# Patient Record
Sex: MALE | Race: BLACK OR AFRICAN AMERICAN | NOT HISPANIC OR LATINO | Employment: FULL TIME | URBAN - METROPOLITAN AREA
[De-identification: names, ages, dates, MRNs, and addresses within clinical notes are randomized per-mention and may not be internally consistent; named-entity substitution may affect disease eponyms.]

---

## 2021-12-14 ENCOUNTER — APPOINTMENT (OUTPATIENT)
Dept: GENERAL RADIOLOGY | Facility: HOSPITAL | Age: 46
End: 2021-12-14

## 2021-12-14 ENCOUNTER — APPOINTMENT (OUTPATIENT)
Dept: CT IMAGING | Facility: HOSPITAL | Age: 46
End: 2021-12-14

## 2021-12-14 ENCOUNTER — HOSPITAL ENCOUNTER (INPATIENT)
Facility: HOSPITAL | Age: 46
LOS: 4 days | Discharge: HOME | End: 2021-12-20
Attending: EMERGENCY MEDICINE

## 2021-12-14 DIAGNOSIS — G47.33 OSA (OBSTRUCTIVE SLEEP APNEA): ICD-10-CM

## 2021-12-14 DIAGNOSIS — R60.1 ANASARCA: ICD-10-CM

## 2021-12-14 DIAGNOSIS — I44.1 SECOND DEGREE HEART BLOCK: ICD-10-CM

## 2021-12-14 DIAGNOSIS — R79.89 ELEVATED SERUM CREATININE: ICD-10-CM

## 2021-12-14 DIAGNOSIS — R03.0 ELEVATED BLOOD PRESSURE READING: ICD-10-CM

## 2021-12-14 DIAGNOSIS — E88.09 HYPOALBUMINEMIA: Primary | ICD-10-CM

## 2021-12-14 LAB
ALBUMIN SERPL-MCNC: 1.7 G/DL (ref 3.5–5.2)
ALBUMIN/GLOB SERPL: 0.5 G/DL
ALP SERPL-CCNC: 85 U/L (ref 39–117)
ALT SERPL W P-5'-P-CCNC: 12 U/L (ref 1–41)
ANION GAP SERPL CALCULATED.3IONS-SCNC: 7.3 MMOL/L (ref 5–15)
AST SERPL-CCNC: 15 U/L (ref 1–40)
BASOPHILS # BLD AUTO: 0.04 10*3/MM3 (ref 0–0.2)
BASOPHILS NFR BLD AUTO: 0.5 % (ref 0–1.5)
BILIRUB SERPL-MCNC: 0.2 MG/DL (ref 0–1.2)
BUN SERPL-MCNC: 11 MG/DL (ref 6–20)
BUN/CREAT SERPL: 6.6 (ref 7–25)
CALCIUM SPEC-SCNC: 7.9 MG/DL (ref 8.6–10.5)
CHLORIDE SERPL-SCNC: 112 MMOL/L (ref 98–107)
CO2 SERPL-SCNC: 24.7 MMOL/L (ref 22–29)
CREAT SERPL-MCNC: 1.66 MG/DL (ref 0.76–1.27)
DEPRECATED RDW RBC AUTO: 41.2 FL (ref 37–54)
EOSINOPHIL # BLD AUTO: 0.17 10*3/MM3 (ref 0–0.4)
EOSINOPHIL NFR BLD AUTO: 2.2 % (ref 0.3–6.2)
ERYTHROCYTE [DISTWIDTH] IN BLOOD BY AUTOMATED COUNT: 12.6 % (ref 12.3–15.4)
GFR SERPL CREATININE-BSD FRML MDRD: 54 ML/MIN/1.73
GLOBULIN UR ELPH-MCNC: 3.2 GM/DL
GLUCOSE SERPL-MCNC: 89 MG/DL (ref 65–99)
HCT VFR BLD AUTO: 34.9 % (ref 37.5–51)
HGB BLD-MCNC: 11.5 G/DL (ref 13–17.7)
HOLD SPECIMEN: NORMAL
HOLD SPECIMEN: NORMAL
IMM GRANULOCYTES # BLD AUTO: 0.01 10*3/MM3 (ref 0–0.05)
IMM GRANULOCYTES NFR BLD AUTO: 0.1 % (ref 0–0.5)
LYMPHOCYTES # BLD AUTO: 2 10*3/MM3 (ref 0.7–3.1)
LYMPHOCYTES NFR BLD AUTO: 26 % (ref 19.6–45.3)
MCH RBC QN AUTO: 30.3 PG (ref 26.6–33)
MCHC RBC AUTO-ENTMCNC: 33 G/DL (ref 31.5–35.7)
MCV RBC AUTO: 91.8 FL (ref 79–97)
MONOCYTES # BLD AUTO: 0.62 10*3/MM3 (ref 0.1–0.9)
MONOCYTES NFR BLD AUTO: 8.1 % (ref 5–12)
NEUTROPHILS NFR BLD AUTO: 4.86 10*3/MM3 (ref 1.7–7)
NEUTROPHILS NFR BLD AUTO: 63.1 % (ref 42.7–76)
NRBC BLD AUTO-RTO: 0 /100 WBC (ref 0–0.2)
NT-PROBNP SERPL-MCNC: 1627 PG/ML (ref 0–450)
PLATELET # BLD AUTO: 284 10*3/MM3 (ref 140–450)
PMV BLD AUTO: 10.7 FL (ref 6–12)
POTASSIUM SERPL-SCNC: 3.7 MMOL/L (ref 3.5–5.2)
PROT SERPL-MCNC: 4.9 G/DL (ref 6–8.5)
QT INTERVAL: 351 MS
RBC # BLD AUTO: 3.8 10*6/MM3 (ref 4.14–5.8)
SARS-COV-2 RNA PNL SPEC NAA+PROBE: NOT DETECTED
SODIUM SERPL-SCNC: 144 MMOL/L (ref 136–145)
TROPONIN T SERPL-MCNC: <0.01 NG/ML (ref 0–0.03)
WBC NRBC COR # BLD: 7.7 10*3/MM3 (ref 3.4–10.8)
WHOLE BLOOD HOLD SPECIMEN: NORMAL
WHOLE BLOOD HOLD SPECIMEN: NORMAL

## 2021-12-14 PROCEDURE — 93005 ELECTROCARDIOGRAM TRACING: CPT

## 2021-12-14 PROCEDURE — 25010000002 FUROSEMIDE PER 20 MG: Performed by: PHYSICIAN ASSISTANT

## 2021-12-14 PROCEDURE — 85025 COMPLETE CBC W/AUTO DIFF WBC: CPT

## 2021-12-14 PROCEDURE — 87635 SARS-COV-2 COVID-19 AMP PRB: CPT | Performed by: PHYSICIAN ASSISTANT

## 2021-12-14 PROCEDURE — 0 IOPAMIDOL PER 1 ML: Performed by: EMERGENCY MEDICINE

## 2021-12-14 PROCEDURE — 84484 ASSAY OF TROPONIN QUANT: CPT | Performed by: PHYSICIAN ASSISTANT

## 2021-12-14 PROCEDURE — 36415 COLL VENOUS BLD VENIPUNCTURE: CPT

## 2021-12-14 PROCEDURE — 71045 X-RAY EXAM CHEST 1 VIEW: CPT

## 2021-12-14 PROCEDURE — 93010 ELECTROCARDIOGRAM REPORT: CPT | Performed by: INTERNAL MEDICINE

## 2021-12-14 PROCEDURE — 99284 EMERGENCY DEPT VISIT MOD MDM: CPT

## 2021-12-14 PROCEDURE — 71275 CT ANGIOGRAPHY CHEST: CPT

## 2021-12-14 PROCEDURE — 80053 COMPREHEN METABOLIC PANEL: CPT

## 2021-12-14 PROCEDURE — 83880 ASSAY OF NATRIURETIC PEPTIDE: CPT | Performed by: PHYSICIAN ASSISTANT

## 2021-12-14 PROCEDURE — 93005 ELECTROCARDIOGRAM TRACING: CPT | Performed by: EMERGENCY MEDICINE

## 2021-12-14 RX ORDER — FUROSEMIDE 10 MG/ML
40 INJECTION INTRAMUSCULAR; INTRAVENOUS ONCE
Status: COMPLETED | OUTPATIENT
Start: 2021-12-14 | End: 2021-12-14

## 2021-12-14 RX ORDER — LABETALOL HYDROCHLORIDE 5 MG/ML
20 INJECTION, SOLUTION INTRAVENOUS ONCE
Status: COMPLETED | OUTPATIENT
Start: 2021-12-14 | End: 2021-12-15

## 2021-12-14 RX ORDER — LABETALOL HYDROCHLORIDE 5 MG/ML
20 INJECTION, SOLUTION INTRAVENOUS ONCE
Status: COMPLETED | OUTPATIENT
Start: 2021-12-14 | End: 2021-12-14

## 2021-12-14 RX ADMIN — SODIUM CHLORIDE 500 ML: 9 INJECTION, SOLUTION INTRAVENOUS at 22:53

## 2021-12-14 RX ADMIN — LABETALOL HYDROCHLORIDE 20 MG: 5 INJECTION, SOLUTION INTRAVENOUS at 22:53

## 2021-12-14 RX ADMIN — FUROSEMIDE 40 MG: 10 INJECTION, SOLUTION INTRAMUSCULAR; INTRAVENOUS at 22:51

## 2021-12-14 RX ADMIN — IOPAMIDOL 95 ML: 755 INJECTION, SOLUTION INTRAVENOUS at 23:18

## 2021-12-14 NOTE — ED TRIAGE NOTES
Patient to er from [facility] urgent care with c/o swelling in bilateral legs and groin area that started over week ago and getting worse. Patient has mask on in triage along with staff.

## 2021-12-15 ENCOUNTER — APPOINTMENT (OUTPATIENT)
Dept: ULTRASOUND IMAGING | Facility: HOSPITAL | Age: 46
End: 2021-12-15

## 2021-12-15 PROBLEM — D64.9 ANEMIA: Status: ACTIVE | Noted: 2021-12-15

## 2021-12-15 PROBLEM — Z72.0 TOBACCO ABUSE: Status: ACTIVE | Noted: 2021-12-15

## 2021-12-15 PROBLEM — R79.89 ELEVATED D-DIMER: Status: ACTIVE | Noted: 2021-12-15

## 2021-12-15 PROBLEM — R31.9 HEMATURIA: Status: ACTIVE | Noted: 2021-12-15

## 2021-12-15 PROBLEM — E66.01 CLASS 3 SEVERE OBESITY IN ADULT (HCC): Status: ACTIVE | Noted: 2021-12-15

## 2021-12-15 PROBLEM — E88.09 HYPOALBUMINEMIA: Status: ACTIVE | Noted: 2021-12-15

## 2021-12-15 PROBLEM — I16.0 HYPERTENSIVE URGENCY: Status: ACTIVE | Noted: 2021-12-15

## 2021-12-15 PROBLEM — R06.00 DYSPNEA: Status: ACTIVE | Noted: 2021-12-15

## 2021-12-15 PROBLEM — R60.1 ANASARCA: Status: ACTIVE | Noted: 2021-12-15

## 2021-12-15 PROBLEM — N17.9 AKI (ACUTE KIDNEY INJURY) (HCC): Status: ACTIVE | Noted: 2021-12-15

## 2021-12-15 PROBLEM — R79.89 ELEVATED SERUM CREATININE: Status: ACTIVE | Noted: 2021-12-15

## 2021-12-15 PROBLEM — R79.89 ELEVATED BRAIN NATRIURETIC PEPTIDE (BNP) LEVEL: Status: ACTIVE | Noted: 2021-12-15

## 2021-12-15 PROBLEM — R80.9 PROTEINURIA: Status: ACTIVE | Noted: 2021-12-15

## 2021-12-15 LAB
ALBUMIN SERPL-MCNC: 1.6 G/DL (ref 3.5–5.2)
ALBUMIN/GLOB SERPL: 0.5 G/DL
ALP SERPL-CCNC: 73 U/L (ref 39–117)
ALT SERPL W P-5'-P-CCNC: 11 U/L (ref 1–41)
ANION GAP SERPL CALCULATED.3IONS-SCNC: 6.7 MMOL/L (ref 5–15)
AST SERPL-CCNC: 11 U/L (ref 1–40)
BACTERIA UR QL AUTO: ABNORMAL /HPF
BILIRUB SERPL-MCNC: 0.3 MG/DL (ref 0–1.2)
BILIRUB UR QL STRIP: NEGATIVE
BUN SERPL-MCNC: 10 MG/DL (ref 6–20)
BUN/CREAT SERPL: 6.5 (ref 7–25)
CALCIUM SPEC-SCNC: 7.7 MG/DL (ref 8.6–10.5)
CHLORIDE SERPL-SCNC: 112 MMOL/L (ref 98–107)
CLARITY UR: CLEAR
CO2 SERPL-SCNC: 26.3 MMOL/L (ref 22–29)
COLOR UR: YELLOW
CREAT SERPL-MCNC: 1.55 MG/DL (ref 0.76–1.27)
CREAT UR-MCNC: 27.5 MG/DL
D DIMER PPP FEU-MCNC: 10.03 MCGFEU/ML (ref 0–0.49)
DEPRECATED RDW RBC AUTO: 40.3 FL (ref 37–54)
ERYTHROCYTE [DISTWIDTH] IN BLOOD BY AUTOMATED COUNT: 12.3 % (ref 12.3–15.4)
GFR SERPL CREATININE-BSD FRML MDRD: 58 ML/MIN/1.73
GLOBULIN UR ELPH-MCNC: 3 GM/DL
GLUCOSE SERPL-MCNC: 90 MG/DL (ref 65–99)
GLUCOSE UR STRIP-MCNC: NEGATIVE MG/DL
HAV IGM SERPL QL IA: NORMAL
HBA1C MFR BLD: 5.2 % (ref 4.8–5.6)
HBV CORE IGM SERPL QL IA: NORMAL
HBV SURFACE AG SERPL QL IA: NORMAL
HCT VFR BLD AUTO: 32.4 % (ref 37.5–51)
HCV AB SER DONR QL: NORMAL
HGB BLD-MCNC: 10.8 G/DL (ref 13–17.7)
HGB UR QL STRIP.AUTO: ABNORMAL
HYALINE CASTS UR QL AUTO: ABNORMAL /LPF
KETONES UR QL STRIP: NEGATIVE
LEUKOCYTE ESTERASE UR QL STRIP.AUTO: NEGATIVE
MCH RBC QN AUTO: 29.9 PG (ref 26.6–33)
MCHC RBC AUTO-ENTMCNC: 33.3 G/DL (ref 31.5–35.7)
MCV RBC AUTO: 89.8 FL (ref 79–97)
NITRITE UR QL STRIP: NEGATIVE
PH UR STRIP.AUTO: 7 [PH] (ref 5–8)
PLATELET # BLD AUTO: 279 10*3/MM3 (ref 140–450)
PMV BLD AUTO: 10.7 FL (ref 6–12)
POTASSIUM SERPL-SCNC: 3.5 MMOL/L (ref 3.5–5.2)
PROT ?TM UR-MCNC: 204 MG/DL
PROT SERPL-MCNC: 4.6 G/DL (ref 6–8.5)
PROT UR QL STRIP: ABNORMAL
PROT/CREAT UR: 7418.2 MG/G CREA (ref 0–200)
RBC # BLD AUTO: 3.61 10*6/MM3 (ref 4.14–5.8)
RBC # UR STRIP: ABNORMAL /HPF
REF LAB TEST METHOD: ABNORMAL
SODIUM SERPL-SCNC: 145 MMOL/L (ref 136–145)
SP GR UR STRIP: 1.01 (ref 1–1.03)
SQUAMOUS #/AREA URNS HPF: ABNORMAL /HPF
URATE SERPL-MCNC: 5.5 MG/DL (ref 3.4–7)
UROBILINOGEN UR QL STRIP: ABNORMAL
WBC # UR STRIP: ABNORMAL /HPF
WBC NRBC COR # BLD: 6.98 10*3/MM3 (ref 3.4–10.8)

## 2021-12-15 PROCEDURE — 80074 ACUTE HEPATITIS PANEL: CPT | Performed by: INTERNAL MEDICINE

## 2021-12-15 PROCEDURE — 36415 COLL VENOUS BLD VENIPUNCTURE: CPT | Performed by: INTERNAL MEDICINE

## 2021-12-15 PROCEDURE — 25010000002 FUROSEMIDE PER 20 MG: Performed by: NURSE PRACTITIONER

## 2021-12-15 PROCEDURE — 80053 COMPREHEN METABOLIC PANEL: CPT | Performed by: NURSE PRACTITIONER

## 2021-12-15 PROCEDURE — 86334 IMMUNOFIX E-PHORESIS SERUM: CPT | Performed by: INTERNAL MEDICINE

## 2021-12-15 PROCEDURE — 85379 FIBRIN DEGRADATION QUANT: CPT | Performed by: NURSE PRACTITIONER

## 2021-12-15 PROCEDURE — 82784 ASSAY IGA/IGD/IGG/IGM EACH: CPT | Performed by: INTERNAL MEDICINE

## 2021-12-15 PROCEDURE — 25010000002 HYDRALAZINE PER 20 MG: Performed by: NURSE PRACTITIONER

## 2021-12-15 PROCEDURE — G0378 HOSPITAL OBSERVATION PER HR: HCPCS

## 2021-12-15 PROCEDURE — 81001 URINALYSIS AUTO W/SCOPE: CPT | Performed by: NURSE PRACTITIONER

## 2021-12-15 PROCEDURE — 83036 HEMOGLOBIN GLYCOSYLATED A1C: CPT | Performed by: NURSE PRACTITIONER

## 2021-12-15 PROCEDURE — 85027 COMPLETE CBC AUTOMATED: CPT | Performed by: NURSE PRACTITIONER

## 2021-12-15 PROCEDURE — 84156 ASSAY OF PROTEIN URINE: CPT | Performed by: NURSE PRACTITIONER

## 2021-12-15 PROCEDURE — 82570 ASSAY OF URINE CREATININE: CPT | Performed by: NURSE PRACTITIONER

## 2021-12-15 PROCEDURE — 84550 ASSAY OF BLOOD/URIC ACID: CPT | Performed by: NURSE PRACTITIONER

## 2021-12-15 PROCEDURE — 76775 US EXAM ABDO BACK WALL LIM: CPT

## 2021-12-15 RX ORDER — ONDANSETRON 4 MG/1
4 TABLET, FILM COATED ORAL EVERY 6 HOURS PRN
Status: DISCONTINUED | OUTPATIENT
Start: 2021-12-15 | End: 2021-12-20 | Stop reason: HOSPADM

## 2021-12-15 RX ORDER — ACETAMINOPHEN 325 MG/1
650 TABLET ORAL EVERY 4 HOURS PRN
Status: DISCONTINUED | OUTPATIENT
Start: 2021-12-15 | End: 2021-12-20 | Stop reason: HOSPADM

## 2021-12-15 RX ORDER — CARVEDILOL 6.25 MG/1
6.25 TABLET ORAL 2 TIMES DAILY WITH MEALS
Status: DISCONTINUED | OUTPATIENT
Start: 2021-12-15 | End: 2021-12-17

## 2021-12-15 RX ORDER — BUMETANIDE 0.25 MG/ML
2 INJECTION INTRAMUSCULAR; INTRAVENOUS EVERY 8 HOURS
Status: DISCONTINUED | OUTPATIENT
Start: 2021-12-15 | End: 2021-12-19

## 2021-12-15 RX ORDER — SODIUM CHLORIDE 0.9 % (FLUSH) 0.9 %
10 SYRINGE (ML) INJECTION AS NEEDED
Status: DISCONTINUED | OUTPATIENT
Start: 2021-12-15 | End: 2021-12-20 | Stop reason: HOSPADM

## 2021-12-15 RX ORDER — NITROGLYCERIN 0.4 MG/1
0.4 TABLET SUBLINGUAL
Status: DISCONTINUED | OUTPATIENT
Start: 2021-12-15 | End: 2021-12-20 | Stop reason: HOSPADM

## 2021-12-15 RX ORDER — ALUMINA, MAGNESIA, AND SIMETHICONE 2400; 2400; 240 MG/30ML; MG/30ML; MG/30ML
15 SUSPENSION ORAL EVERY 6 HOURS PRN
Status: DISCONTINUED | OUTPATIENT
Start: 2021-12-15 | End: 2021-12-20 | Stop reason: HOSPADM

## 2021-12-15 RX ORDER — CLONIDINE 0.2 MG/24H
1 PATCH, EXTENDED RELEASE TRANSDERMAL WEEKLY
Status: DISCONTINUED | OUTPATIENT
Start: 2021-12-15 | End: 2021-12-17

## 2021-12-15 RX ORDER — FUROSEMIDE 10 MG/ML
40 INJECTION INTRAMUSCULAR; INTRAVENOUS EVERY 12 HOURS
Status: DISCONTINUED | OUTPATIENT
Start: 2021-12-15 | End: 2021-12-15

## 2021-12-15 RX ORDER — BUMETANIDE 0.25 MG/ML
2 INJECTION INTRAMUSCULAR; INTRAVENOUS
Status: DISCONTINUED | OUTPATIENT
Start: 2021-12-15 | End: 2021-12-15

## 2021-12-15 RX ORDER — HYDRALAZINE HYDROCHLORIDE 20 MG/ML
20 INJECTION INTRAMUSCULAR; INTRAVENOUS ONCE
Status: COMPLETED | OUTPATIENT
Start: 2021-12-15 | End: 2021-12-15

## 2021-12-15 RX ORDER — POTASSIUM CHLORIDE 750 MG/1
40 TABLET, FILM COATED, EXTENDED RELEASE ORAL ONCE
Status: COMPLETED | OUTPATIENT
Start: 2021-12-15 | End: 2021-12-15

## 2021-12-15 RX ORDER — AMLODIPINE BESYLATE 5 MG/1
5 TABLET ORAL
Status: DISCONTINUED | OUTPATIENT
Start: 2021-12-16 | End: 2021-12-17

## 2021-12-15 RX ORDER — HYDRALAZINE HYDROCHLORIDE 20 MG/ML
10 INJECTION INTRAMUSCULAR; INTRAVENOUS EVERY 6 HOURS PRN
Status: DISCONTINUED | OUTPATIENT
Start: 2021-12-15 | End: 2021-12-20 | Stop reason: HOSPADM

## 2021-12-15 RX ORDER — ONDANSETRON 2 MG/ML
4 INJECTION INTRAMUSCULAR; INTRAVENOUS EVERY 6 HOURS PRN
Status: DISCONTINUED | OUTPATIENT
Start: 2021-12-15 | End: 2021-12-20 | Stop reason: HOSPADM

## 2021-12-15 RX ADMIN — BUMETANIDE 2 MG: 0.25 INJECTION INTRAMUSCULAR; INTRAVENOUS at 21:47

## 2021-12-15 RX ADMIN — CARVEDILOL 6.25 MG: 6.25 TABLET, FILM COATED ORAL at 18:30

## 2021-12-15 RX ADMIN — HYDRALAZINE HYDROCHLORIDE 10 MG: 20 INJECTION INTRAMUSCULAR; INTRAVENOUS at 21:55

## 2021-12-15 RX ADMIN — CLONIDINE 1 PATCH: 0.2 PATCH TRANSDERMAL at 20:08

## 2021-12-15 RX ADMIN — POTASSIUM CHLORIDE 40 MEQ: 10 TABLET, EXTENDED RELEASE ORAL at 16:46

## 2021-12-15 RX ADMIN — HYDRALAZINE HYDROCHLORIDE 10 MG: 20 INJECTION INTRAMUSCULAR; INTRAVENOUS at 12:57

## 2021-12-15 RX ADMIN — HYDRALAZINE HYDROCHLORIDE 20 MG: 20 INJECTION INTRAMUSCULAR; INTRAVENOUS at 10:56

## 2021-12-15 RX ADMIN — FUROSEMIDE 40 MG: 10 INJECTION, SOLUTION INTRAMUSCULAR; INTRAVENOUS at 10:56

## 2021-12-15 RX ADMIN — LABETALOL HYDROCHLORIDE 20 MG: 5 INJECTION, SOLUTION INTRAVENOUS at 01:06

## 2021-12-15 NOTE — CONSULTS
"  Referring Provider: Elke Chavez APRN   Reason for Consultation: CARROL    Subjective     Chief complaint   Chief Complaint   Patient presents with   • Leg Swelling   • Groin Swelling   • Shortness of Breath       History of present illness:      45 Y/O AAM who was not seen by doctor before who came in due to worsening LE and TOWNSEND. Labs showed severe proteinuria. CR was 1.6 mg/dl on admission. He had CTA to rule out PE. Patient does not have family history of ESRD. He takes daily excessive NSAID's                 Past Medical History:   Diagnosis Date   • Hypertension      Past Surgical History:   Procedure Laterality Date   • TOE SURGERY       Family History   Problem Relation Age of Onset   • Diabetes Father      Social History     Tobacco Use   • Smoking status: Current Every Day Smoker     Types: Cigarettes   • Smokeless tobacco: Never Used   Vaping Use   • Vaping Use: Never used   Substance Use Topics   • Alcohol use: Never   • Drug use: Defer     (Not in a hospital admission)    Allergies:  Patient has no known allergies.    Review of Systems  Pertinent items are noted in HPI.    Objective     Vital Signs  Temp:  [98.4 °F (36.9 °C)] 98.4 °F (36.9 °C)  Heart Rate:  [] 92  Resp:  [20] 20  BP: (177-209)/(106-143) 186/121    Flowsheet Rows      First Filed Value   Admission Height 190.5 cm (75\") Documented at 12/14/2021 2201   Admission Weight 181 kg (400 lb) Documented at 12/14/2021 2201           I/O this shift:  In: 720 [P.O.:720]  Out: 1500 [Urine:1500]  No intake/output data recorded.    Intake/Output Summary (Last 24 hours) at 12/15/2021 1545  Last data filed at 12/15/2021 1528  Gross per 24 hour   Intake 720 ml   Output 1500 ml   Net -780 ml       Physical Exam:     General Appearance:    Alert, cooperative, in no acute distress   Head:    Normocephalic, without obvious abnormality, atraumatic   Eyes:            Lids and lashes normal, conjunctivae and sclerae normal, no   icterus, no pallor, " corneas clear, PERRLA   Ears:    Ears appear intact with no abnormalities noted   Throat:   No oral lesions, no thrush, oral mucosa moist   Neck:   No adenopathy, supple, trachea midline, no thyromegaly, no   carotid bruit, no JVD   Back:     No kyphosis present, no scoliosis present, no skin lesions,      erythema or scars, no tenderness to percussion or                   palpation,   range of motion normal   Lungs:     Clear to auscultation,respirations regular, even and                  unlabored    Heart:    Regular rhythm and normal rate, normal S1 and S2, no            murmur, no gallop, no rub, no click   Chest Wall:    No abnormalities observed   Abdomen:     Normal bowel sounds, no masses, no organomegaly, soft        non-tender, non-distended, no guarding, no rebound                tenderness   Rectal:     Deferred   Extremities:   Moves all extremities well, +++ edema, no cyanosis, no             redness   Pulses:   Pulses palpable and equal bilaterally   Skin:   No bleeding, bruising or rash   Lymph nodes:   No palpable adenopathy   Neurologic:   Cranial nerves 2 - 12 grossly intact, sensation intact, DTR       present and equal bilaterally       Results Review:  Results from last 7 days   Lab Units 12/15/21  0534 12/14/21  1655   SODIUM mmol/L 145 144   POTASSIUM mmol/L 3.5 3.7   CHLORIDE mmol/L 112* 112*   CO2 mmol/L 26.3 24.7   BUN mg/dL 10 11   CREATININE mg/dL 1.55* 1.66*   CALCIUM mg/dL 7.7* 7.9*   BILIRUBIN mg/dL 0.3 0.2   ALK PHOS U/L 73 85   ALT (SGPT) U/L 11 12   AST (SGOT) U/L 11 15   GLUCOSE mg/dL 90 89       Estimated Creatinine Clearance: 101.4 mL/min (A) (by C-G formula based on SCr of 1.55 mg/dL (H)).          Results from last 7 days   Lab Units 12/15/21  0534 12/14/21  1655   WBC 10*3/mm3 6.98 7.70   HEMOGLOBIN g/dL 10.8* 11.5*   PLATELETS 10*3/mm3 279 284             Active Medications  carvedilol, 6.25 mg, Oral, BID With Meals  furosemide, 40 mg, Intravenous, Q12H            Assessment/Plan   Assessment      Anasarca    Hypoalbuminemia    Elevated serum creatinine    Tobacco abuse    Class 3 severe obesity in adult (HCC)    Dyspnea    Hypertensive urgency    CARROL (acute kidney injury) ([facility])    Elevated brain natriuretic peptide (BNP) level    Proteinuria    Hematuria    Anemia    Elevated d-dimer    - Proteinuria: Etiology is not very clear. He takes excessive NSAID;s. Will likely need a kidney biopsy. Will send GN work-up. Stat aggressive diuresis. High D Dimer with proteinuria makes us worried about DVT/PE but CTA is negative. Avoid NSAID's. Will need to monitor kidney function after CTA.     - CARROL on CKD : Likely due to intravascular depletion and severe proteinuria. Will monitor kidney function closely  - High D Dimer: Had CTA  That was negative  - HTN:: Start Clonidine and Amlodipine  - hypokalemia: Will check renin and aldosterone and renal doppler  - Hypocalcemia: Check PTH and Vit D        Marylu Landry MD  12/15/21  15:45 EST

## 2021-12-15 NOTE — ED NOTES
Patient was wearing a face mask throughout our encounter.  This RN wore appropriate PPE throughout the encounter.  Hand hygiene was performed before and after patient encounter.        Ciera Blankenship RN  12/14/21 [id number]

## 2021-12-15 NOTE — ED PROVIDER NOTES
Brief history of present illness: 46-year-old male    Physical exam:   Presents to the emergency department with progressive bilateral lower extremity swelling over 1 week with some associated shortness of breath.  Patient denies any alcohol use or liver disease.  He reports no fevers.    MDM:    ED Triage Vitals   Temp Heart Rate Resp BP SpO2   12/14/21 1608 12/14/21 1608 12/14/21 1612 12/14/21 1610 12/14/21 1608   98.4 °F (36.9 °C) 117 20 (!) 209/143 99 %      Temp src Heart Rate Source Patient Position BP Location FiO2 (%)   12/14/21 1608 -- -- -- --   Infrared         No acute distress.  Large habitus noted in general.  No respiratory distress or tachypnea noted.  Pink warm and perfusing.  Patient has diffuse anasarca noted lower extremities, abdominal wall, and upper extremities.  Normal mood and affect.    Results for orders placed or performed during the hospital encounter of 12/14/21   Comprehensive Metabolic Panel    Specimen: Blood   Result Value Ref Range    Glucose 89 65 - 99 mg/dL    BUN 11 6 - 20 mg/dL    Creatinine 1.66 (H) 0.76 - 1.27 mg/dL    Sodium 144 136 - 145 mmol/L    Potassium 3.7 3.5 - 5.2 mmol/L    Chloride 112 (H) 98 - 107 mmol/L    CO2 24.7 22.0 - 29.0 mmol/L    Calcium 7.9 (L) 8.6 - 10.5 mg/dL    Total Protein 4.9 (L) 6.0 - 8.5 g/dL    Albumin 1.70 (L) 3.50 - 5.20 g/dL    ALT (SGPT) 12 1 - 41 U/L    AST (SGOT) 15 1 - 40 U/L    Alkaline Phosphatase 85 39 - 117 U/L    Total Bilirubin 0.2 0.0 - 1.2 mg/dL    eGFR  African Amer 54 (L) >60 mL/min/1.73    Globulin 3.2 gm/dL    A/G Ratio 0.5 g/dL    BUN/Creatinine Ratio 6.6 (L) 7.0 - 25.0    Anion Gap 7.3 5.0 - 15.0 mmol/L   CBC Auto Differential    Specimen: Blood   Result Value Ref Range    WBC 7.70 3.40 - 10.80 10*3/mm3    RBC 3.80 (L) 4.14 - 5.80 10*6/mm3    Hemoglobin 11.5 (L) 13.0 - 17.7 g/dL    Hematocrit 34.9 (L) 37.5 - 51.0 %    MCV 91.8 79.0 - 97.0 fL    MCH 30.3 26.6 - 33.0 pg    MCHC 33.0 31.5 - 35.7 g/dL    RDW 12.6 12.3 - 15.4 %     RDW-SD 41.2 37.0 - 54.0 fl    MPV 10.7 6.0 - 12.0 fL    Platelets 284 140 - 450 10*3/mm3    Neutrophil % 63.1 42.7 - 76.0 %    Lymphocyte % 26.0 19.6 - 45.3 %    Monocyte % 8.1 5.0 - 12.0 %    Eosinophil % 2.2 0.3 - 6.2 %    Basophil % 0.5 0.0 - 1.5 %    Immature Grans % 0.1 0.0 - 0.5 %    Neutrophils, Absolute 4.86 1.70 - 7.00 10*3/mm3    Lymphocytes, Absolute 2.00 0.70 - 3.10 10*3/mm3    Monocytes, Absolute 0.62 0.10 - 0.90 10*3/mm3    Eosinophils, Absolute 0.17 0.00 - 0.40 10*3/mm3    Basophils, Absolute 0.04 0.00 - 0.20 10*3/mm3    Immature Grans, Absolute 0.01 0.00 - 0.05 10*3/mm3    nRBC 0.0 0.0 - 0.2 /100 WBC   BNP    Specimen: Blood   Result Value Ref Range    proBNP 1,627.0 (H) 0.0 - 450.0 pg/mL   Troponin    Specimen: Blood   Result Value Ref Range    Troponin T <0.010 0.000 - 0.030 ng/mL   ECG 12 Lead   Result Value Ref Range    QT Interval 351 ms   Green Top (Gel)   Result Value Ref Range    Extra Tube Hold for add-ons.    Lavender Top   Result Value Ref Range    Extra Tube hold for add-on    Gold Top - SST   Result Value Ref Range    Extra Tube Hold for add-ons.    Light Blue Top   Result Value Ref Range    Extra Tube hold for add-on      XR Chest 1 View    Result Date: 12/14/2021  Narrative: SINGLE VIEW OF THE CHEST  HISTORY: Severe  COMPARISON: None available.  FINDINGS: Cardiomegaly is present. There may be some vascular congestion. No pneumothorax or pleural effusion is seen. No acute infiltrates are identified.      Impression: Cardiomegaly with suspected vascular congestion.  This report was finalized on 12/14/2021 10:25 PM by Dr. Eli Yeboah M.D.      I have recommended admission for further evaluation and treatment in this patient who has acute findings that could be potentially life-threatening.  Patient expresses understanding but may not be able to stay due to social commitments.  Discussed risk benefits and alternatives of leaving AGAINST MEDICAL ADVICE.  Patient will decide and let us  know.    I have seen and personally evaluated this patient, discussed the case with the treating advanced practice provider, and reviewed their note. I was involved in the medical decision making during the evaluation, testing and disposition planning for this patient.     Rock Felton MD  12/14/21 [id number]

## 2021-12-15 NOTE — H&P
Patient Name:  Alexis Packer  YOB: 1975  MRN:  [id number]  Admit Date:  12/14/2021  Patient Care Team:  Provider, No Known as PCP - General      Subjective   History Present Illness     Chief Complaint   Patient presents with   • Leg Swelling   • Groin Swelling   • Shortness of Breath       Mr. Packer is a 46 y.o. with a history of severe obesity but no prior diagnosed medical conditions because he does not have routine medical care.  He presents with 2 weeks of severe bilateral lower extremity edema and anasarca including scrotal swelling.  The swelling in his legs is worse after day working on his feet.  It does improve a little bit in the morning but progresses again throughout the day.  He denies chest pain but has had shortness of breath with exertion.  He denies a history of alcohol abuse but smokes cigarettes.  He denies headache, lightheadedness, palpitations, fever, chills, cough, congestion, LUTS.          History of Present Illness  Review of Systems   Constitutional: Negative for activity change, appetite change, chills, diaphoresis, fatigue, fever and unexpected weight change.   HENT: Positive for congestion. Negative for trouble swallowing.    Respiratory: Positive for shortness of breath. Negative for choking and chest tightness.    Cardiovascular: Negative for chest pain.   Gastrointestinal: Negative for abdominal distention, abdominal pain, blood in stool, constipation, diarrhea, nausea and vomiting.   Genitourinary: Positive for scrotal swelling. Negative for dysuria and flank pain.   Musculoskeletal: Negative for back pain.   Skin: Negative for pallor.   Neurological: Negative for dizziness, tremors, syncope, speech difficulty, weakness, light-headedness, numbness and headaches.   Hematological: Does not bruise/bleed easily.   Psychiatric/Behavioral: Negative for confusion.        Personal History     No past medical history on file.  Past Surgical History:   Procedure  Laterality Date   • TOE SURGERY       Family History   Problem Relation Age of Onset   • Diabetes Father      Social History     Tobacco Use   • Smoking status: Current Every Day Smoker     Types: Cigarettes   • Smokeless tobacco: Never Used   Vaping Use   • Vaping Use: Never used   Substance Use Topics   • Alcohol use: Never   • Drug use: Defer     No current facility-administered medications on file prior to encounter.     No current outpatient medications on file prior to encounter.     No Known Allergies    Objective    Objective     Vital Signs  Temp:  [98.4 °F (36.9 °C)-99.3 °F (37.4 °C)] 98.4 °F (36.9 °C)  Heart Rate:  [] 90  Resp:  [14-20] 20  BP: (179-214)/(106-143) 195/114  SpO2:  [95 %-99 %] 95 %  on   ;   Device (Oxygen Therapy): room air  Body mass index is 50 kg/m².    Physical Exam  Vitals and nursing note reviewed.   Constitutional:       General: He is not in acute distress.     Appearance: He is well-developed. He is obese. He is ill-appearing. He is not toxic-appearing.      Comments: Chronically ill-appearing   HENT:      Head: Normocephalic and atraumatic.      Mouth/Throat:      Mouth: Mucous membranes are moist.      Comments: Poor dentition  Eyes:      Extraocular Movements: Extraocular movements intact.      Conjunctiva/sclera: Conjunctivae normal.   Neck:      Trachea: No tracheal deviation.   Cardiovascular:      Rate and Rhythm: Regular rhythm. Tachycardia present.   Pulmonary:      Effort: Pulmonary effort is normal. No respiratory distress.      Breath sounds: Normal breath sounds.      Comments: Diminished breath sounds but exam limited by body habitus  Abdominal:      General: Bowel sounds are normal. There is no distension.      Palpations: Abdomen is soft. There is no mass.      Tenderness: There is no abdominal tenderness. There is no guarding or rebound.      Comments: Abdominal wall edema   Musculoskeletal:         General: Normal range of motion.      Cervical back: Normal  range of motion.      Comments: Diffuse anasarca involving all 4 extremities.  Mild scrotal swelling   Skin:     General: Skin is warm and dry.      Capillary Refill: Capillary refill takes 2 to 3 seconds.   Neurological:      General: No focal deficit present.      Mental Status: He is alert and oriented to person, place, and time.   Psychiatric:         Mood and Affect: Mood normal.         Behavior: Behavior normal.         Results Review:  I reviewed the patient's new clinical results.  I reviewed the patient's new imaging results and agree with the interpretation.  I reviewed the patient's other test results and agree with the interpretation  I personally viewed and interpreted the patient's EKG/Telemetry data  Discussed with ED provider.    Lab Results (last 24 hours)     Procedure Component Value Units Date/Time    CBC & Differential [[id number]]  (Abnormal) Collected: 12/14/21 1655    Specimen: Blood Updated: 12/14/21 1934    Narrative:      The following orders were created for panel order CBC & Differential.  Procedure                               Abnormality         Status                     ---------                               -----------         ------                     CBC Auto Differential[[id number]]        Abnormal            Final result                 Please view results for these tests on the individual orders.    Comprehensive Metabolic Panel [[id number]]  (Abnormal) Collected: 12/14/21 1655    Specimen: Blood Updated: 12/14/21 2030     Glucose 89 mg/dL      BUN 11 mg/dL      Creatinine 1.66 mg/dL      Sodium 144 mmol/L      Potassium 3.7 mmol/L      Chloride 112 mmol/L      CO2 24.7 mmol/L      Calcium 7.9 mg/dL      Total Protein 4.9 g/dL      Albumin 1.70 g/dL      ALT (SGPT) 12 U/L      AST (SGOT) 15 U/L      Alkaline Phosphatase 85 U/L      Total Bilirubin 0.2 mg/dL      eGFR   Amer 54 mL/min/1.73      Globulin 3.2 gm/dL      A/G Ratio 0.5 g/dL      BUN/Creatinine Ratio 6.6      Anion Gap 7.3 mmol/L     Narrative:      GFR Normal >60  Chronic Kidney Disease <60  Kidney Failure <15      CBC Auto Differential [[id number]]  (Abnormal) Collected: 12/14/21 1655    Specimen: Blood Updated: 12/14/21 1934     WBC 7.70 10*3/mm3      RBC 3.80 10*6/mm3      Hemoglobin 11.5 g/dL      Hematocrit 34.9 %      MCV 91.8 fL      MCH 30.3 pg      MCHC 33.0 g/dL      RDW 12.6 %      RDW-SD 41.2 fl      MPV 10.7 fL      Platelets 284 10*3/mm3      Neutrophil % 63.1 %      Lymphocyte % 26.0 %      Monocyte % 8.1 %      Eosinophil % 2.2 %      Basophil % 0.5 %      Immature Grans % 0.1 %      Neutrophils, Absolute 4.86 10*3/mm3      Lymphocytes, Absolute 2.00 10*3/mm3      Monocytes, Absolute 0.62 10*3/mm3      Eosinophils, Absolute 0.17 10*3/mm3      Basophils, Absolute 0.04 10*3/mm3      Immature Grans, Absolute 0.01 10*3/mm3      nRBC 0.0 /100 WBC     BNP [[id number]]  (Abnormal) Collected: 12/14/21 2200    Specimen: Blood Updated: 12/14/21 2301     proBNP 1,627.0 pg/mL     Narrative:      Among patients with dyspnea, NT-proBNP is highly sensitive for the detection of acute congestive heart failure. In addition NT-proBNP of <300 pg/ml effectively rules out acute congestive heart failure with 99% negative predictive value.    Results may be falsely decreased if patient taking Biotin.      Troponin [[id number]]  (Normal) Collected: 12/14/21 2200    Specimen: Blood Updated: 12/14/21 2301     Troponin T <0.010 ng/mL     Narrative:      Troponin T Reference Range:  <= 0.03 ng/mL-   Negative for AMI  >0.03 ng/mL-     Abnormal for myocardial necrosis.  Clinicians would have to utilize clinical acumen, EKG, Troponin and serial changes to determine if it is an Acute Myocardial Infarction or myocardial injury due to an underlying chronic condition.       Results may be falsely decreased if patient taking Biotin.      COVID PRE-OP / PRE-PROCEDURE SCREENING ORDER (NO ISOLATION) - Swab, Nasopharynx [[id number]]  (Normal)  Collected: 12/14/21 2239    Specimen: Swab from Nasopharynx Updated: 12/14/21 2328    Narrative:      The following orders were created for panel order COVID PRE-OP / PRE-PROCEDURE SCREENING ORDER (NO ISOLATION) - Swab, Nasopharynx.  Procedure                               Abnormality         Status                     ---------                               -----------         ------                     COVID-19,[facility]...[[id number]]  Normal              Final result                 Please view results for these tests on the individual orders.    COVID-19,[facility] CEPHEID/BARRON NP SWAB IN TRANSPORT MEDIA 8-12 HR TAT - Swab, Nasopharynx [[id number]]  (Normal) Collected: 12/14/21 2239    Specimen: Swab from Nasopharynx Updated: 12/14/21 2328     COVID19 Not Detected    Narrative:      Fact sheet for providers: https://www.fda.gov/media/064946/download    Fact sheet for patients: https://www.fda.gov/media/565779/download    Test performed by PCR.    Comprehensive Metabolic Panel [[id number]]  (Abnormal) Collected: 12/15/21 0534    Specimen: Blood Updated: 12/15/21 0622     Glucose 90 mg/dL      BUN 10 mg/dL      Creatinine 1.55 mg/dL      Sodium 145 mmol/L      Potassium 3.5 mmol/L      Chloride 112 mmol/L      CO2 26.3 mmol/L      Calcium 7.7 mg/dL      Total Protein 4.6 g/dL      Albumin 1.60 g/dL      ALT (SGPT) 11 U/L      AST (SGOT) 11 U/L      Alkaline Phosphatase 73 U/L      Total Bilirubin 0.3 mg/dL      eGFR  African Amer 58 mL/min/1.73      Globulin 3.0 gm/dL      A/G Ratio 0.5 g/dL      BUN/Creatinine Ratio 6.5     Anion Gap 6.7 mmol/L     Narrative:      GFR Normal >60  Chronic Kidney Disease <60  Kidney Failure <15      CBC (No Diff) [[id number]]  (Abnormal) Collected: 12/15/21 0534    Specimen: Blood Updated: 12/15/21 0600     WBC 6.98 10*3/mm3      RBC 3.61 10*6/mm3      Hemoglobin 10.8 g/dL      Hematocrit 32.4 %      MCV 89.8 fL      MCH 29.9 pg      MCHC 33.3 g/dL      RDW 12.3 %       RDW-SD 40.3 fl      MPV 10.7 fL      Platelets 279 10*3/mm3     Uric Acid [[id number]]  (Normal) Collected: 12/15/21 0534    Specimen: Blood Updated: 12/15/21 1037     Uric Acid 5.5 mg/dL     Hemoglobin A1c [[id number]]  (Normal) Collected: 12/15/21 0534    Specimen: Blood Updated: 12/15/21 1029     Hemoglobin A1C 5.20 %     Narrative:      Hemoglobin A1C Ranges:    Increased Risk for Diabetes  5.7% to 6.4%  Diabetes                     >= 6.5%  Diabetic Goal                < 7.0%          Imaging Results (Last 24 Hours)     Procedure Component Value Units Date/Time    CT Angiogram Chest [[id number]] Collected: 12/14/21 2348     Updated: 12/14/21 2356    Narrative:      CT ANGIOGRAM OF THE CHEST     HISTORY: Tachycardia     COMPARISON: None available.     TECHNIQUE: Axial CT imaging was obtained through the thorax. IV contrast  was administered. Three-D reformatted images were obtained.     FINDINGS:  Exam is degraded by poor timing of the contrast bolus, as well as  respiratory artifact and the patient's body habitus. An obvious  pulmonary thromboembolus is not seen. The heart is enlarged. There is  interlobular septal thickening. There are small bilateral pleural  effusions, left greater than right. Constellation of findings is  suspicious for congestive heart failure. Thyroid gland, trachea,  esophagus appear unremarkable. Thoracic aorta measures within normal  size limits. There is no evidence of dissection. No acute abnormalities  are seen within the upper abdomen. No acute osseous abnormalities are  seen. There are some prominent mediastinal and hilar lymph nodes. For  example, a subcarinal node measures up to 1.9 cm. Right hilar node  measures up to 2.1 cm. Short-term CT follow-up in 3 months is suggested.  There does appear to be some body wall edema.       Impression:      Cardiomegaly, interlobular septal thickening, and small bilateral  pleural effusions, suspicious for congestive heart failure.      Radiation dose reduction techniques were utilized, including automated  exposure control and exposure modulation based on body size.     This report was finalized on 12/14/2021 11:53 PM by Dr. Eli Yeboah M.D.       XR Chest 1 View [[id number]] Collected: 12/14/21 2224     Updated: 12/14/21 2228    Narrative:      SINGLE VIEW OF THE CHEST     HISTORY: Severe     COMPARISON: None available.     FINDINGS:  Cardiomegaly is present. There may be some vascular congestion. No  pneumothorax or pleural effusion is seen. No acute infiltrates are  identified.       Impression:      Cardiomegaly with suspected vascular congestion.     This report was finalized on 12/14/2021 10:25 PM by Dr. Eli Yeboah M.D.                 ECG 12 Lead   Final Result   HEART RATE= 110  bpm   RR Interval= 544  ms   CT Interval= 156  ms   P Horizontal Axis= 39  deg   P Front Axis= 56  deg   QRSD Interval= 85  ms   QT Interval= 351  ms   QRS Axis= 21  deg   T Wave Axis= 34  deg   - BORDERLINE ECG -   Sinus tachycardia   Borderline T wave abnormalities   Borderline prolonged QT interval   NO PRIOR TRACING AVAILABLE FOR COMPARISON   Electronically Signed By: Parker Sagastume ([facility]) 14-Dec-2021 18:29:57   Date and Time of Study: 2021-12-14 16:25:09           Assessment/Plan     Active Hospital Problems    Diagnosis  POA   • **Anasarca [R60.1]  Yes   • Hypoalbuminemia [E88.09]  Yes   • Elevated serum creatinine [R79.89]  Yes   • Tobacco abuse [Z72.0]  Yes   • Class 3 severe obesity in adult (HCC) [E66.01]  Yes   • Dyspnea [R06.00]  Yes   • Hypertensive urgency [I16.0]  Yes   • CARROL (acute kidney injury) ([facility]) [N17.9]  Yes   • Elevated brain natriuretic peptide (BNP) level [R79.89]  Yes      Resolved Hospital Problems   No resolved problems to display.       Mr. Packer is a 46 y.o. that presents with anasarca, hypoalbuminemia, elevated serum creatinine with no baseline labs or prior diagnostic work-up in the setting of severe obesity. EKG  without acute ischemic change, borderline prolonged QTC.  No troponin elevation.  Elevated BNP, normal uric acid.  Creatinine 1.66 slightly improved to 1.55 after IV Lasix 40 mg.  He is stable on room air.  Consult nephrology for assistance with volume management and work-up. Check urine studies   Check echocardiogram, A1c, lipid panel, trend BMP   HTN- untreated. PRN hydralazine for now   Daily weights, I/O  High risk for FRANCISCO JAVIER given obesity.  Recommend outpatient PSG  Nutrition consult     · I discussed the patient's findings and my recommendations with patient, nursing staff, ED provider and Dr Holcomb.    VTE Prophylaxis - SCDs.  Code Status - Full code.       RADHA Lobato  [facility]  12/15/21  11:04 EST

## 2021-12-15 NOTE — NURSING NOTE
Spoke to Dr. Landry regarding order put in today at 1614 while pt was in ED. Was for Bumex 2 mg IV q8 hours-RT. This was not given in ED. I clarified that this was not for RT to give with Dr. Landry. He gave me new order for Bumex 2 mg IV q 8 hours. I tried to d/c RT order and enter new Bumex order but computer would not let me. I spoke to Gerardo pharmacist and he said that another pharmacist needed to clarify this order also because pt is on Lasix IV also so they had placed call to on call . I was told to let order hang out and they would verify and correct as needed.

## 2021-12-15 NOTE — ED PROVIDER NOTES
EMERGENCY DEPARTMENT ENCOUNTER    Room Number:  19/19  Date of encounter:  12/15/2021  PCP: Provider, No Known  Historian: Patient      HPI:  Chief Complaint: Bilateral lower extremity swelling and shortness of breath  A complete HPI/ROS/PMH/PSH/SH/FH are unobtainable due to: Nothing    Context: Alexis Packer is a 46 y.o. male who presents to the ED c/o bilateral lower extremity swelling and some shortness of breath.  Patient states the symptoms began approximately 2 weeks ago and is gradually progressed.  He informs me he works long days on his feet and the swelling is more significant by the end of the day.  Over the past day or 2 the swelling now extends from his feet all the way to his abdomen.  He states there is moderate discomfort with ambulating.  Shortness of breath is not orthopneic in nature and is primarily worse with exertion.  He denies chest pain, fever, chills, recent sick contacts.  He is here for further evaluation.      PAST MEDICAL HISTORY  Active Ambulatory Problems     Diagnosis Date Noted   • No Active Ambulatory Problems     Resolved Ambulatory Problems     Diagnosis Date Noted   • No Resolved Ambulatory Problems     No Additional Past Medical History         PAST SURGICAL HISTORY  Past Surgical History:   Procedure Laterality Date   • TOE SURGERY           FAMILY HISTORY  Family History   Problem Relation Age of Onset   • Diabetes Father          SOCIAL HISTORY  Social History     Socioeconomic History   • Marital status: Single   Tobacco Use   • Smoking status: Current Every Day Smoker     Types: Cigarettes   • Smokeless tobacco: Never Used   Vaping Use   • Vaping Use: Never used   Substance and Sexual Activity   • Alcohol use: Never   • Drug use: Defer   • Sexual activity: Defer         ALLERGIES  Patient has no known allergies.        REVIEW OF SYSTEMS  Review of Systems   Constitutional: Negative for chills and fever.   HENT: Negative.    Eyes: Negative.    Respiratory: Positive for  shortness of breath. Negative for cough and choking.    Cardiovascular: Positive for leg swelling. Negative for chest pain and palpitations.   Gastrointestinal: Negative for blood in stool, nausea and vomiting.        Abdominal wall swelling   Genitourinary: Negative.    Musculoskeletal: Negative.    Skin: Negative.    Neurological: Negative.    Psychiatric/Behavioral: Negative.         All systems reviewed and negative except for those discussed in HPI.       PHYSICAL EXAM    I have reviewed the triage vital signs and nursing notes.    ED Triage Vitals   Temp Heart Rate Resp BP SpO2   12/14/21 1608 12/14/21 1608 12/14/21 1612 12/14/21 1610 12/14/21 1608   98.4 °F (36.9 °C) 117 20 (!) 209/143 99 %      Temp src Heart Rate Source Patient Position BP Location FiO2 (%)   12/14/21 1608 -- -- -- --   Infrared           Physical Exam  GENERAL: Very pleasant, nontoxic, no acute distress   HENT: nares patent  EYES: no scleral icterus  CV: regular rhythm, regular rate, no rubs murmurs gallops.  Significant lower extremity pedal edema, mild upper extremity edema  RESPIRATORY: normal effort, no obvious rales wheezes or rhonchi  ABDOMEN: soft, nontender, abdominal wall edema  MUSCULOSKELETAL: no deformity  NEURO: alert, moves all extremities, follows commands  SKIN: warm, dry        LAB RESULTS  Recent Results (from the past 24 hour(s))   ECG 12 Lead    Collection Time: 12/14/21  4:25 PM   Result Value Ref Range    QT Interval 351 ms   Green Top (Gel)    Collection Time: 12/14/21  4:55 PM   Result Value Ref Range    Extra Tube Hold for add-ons.    Lavender Top    Collection Time: 12/14/21  4:55 PM   Result Value Ref Range    Extra Tube hold for add-on    Gold Top - SST    Collection Time: 12/14/21  4:55 PM   Result Value Ref Range    Extra Tube Hold for add-ons.    Light Blue Top    Collection Time: 12/14/21  4:55 PM   Result Value Ref Range    Extra Tube hold for add-on    Comprehensive Metabolic Panel    Collection Time:  12/14/21  4:55 PM    Specimen: Blood   Result Value Ref Range    Glucose 89 65 - 99 mg/dL    BUN 11 6 - 20 mg/dL    Creatinine 1.66 (H) 0.76 - 1.27 mg/dL    Sodium 144 136 - 145 mmol/L    Potassium 3.7 3.5 - 5.2 mmol/L    Chloride 112 (H) 98 - 107 mmol/L    CO2 24.7 22.0 - 29.0 mmol/L    Calcium 7.9 (L) 8.6 - 10.5 mg/dL    Total Protein 4.9 (L) 6.0 - 8.5 g/dL    Albumin 1.70 (L) 3.50 - 5.20 g/dL    ALT (SGPT) 12 1 - 41 U/L    AST (SGOT) 15 1 - 40 U/L    Alkaline Phosphatase 85 39 - 117 U/L    Total Bilirubin 0.2 0.0 - 1.2 mg/dL    eGFR  African Amer 54 (L) >60 mL/min/1.73    Globulin 3.2 gm/dL    A/G Ratio 0.5 g/dL    BUN/Creatinine Ratio 6.6 (L) 7.0 - 25.0    Anion Gap 7.3 5.0 - 15.0 mmol/L   CBC Auto Differential    Collection Time: 12/14/21  4:55 PM    Specimen: Blood   Result Value Ref Range    WBC 7.70 3.40 - 10.80 10*3/mm3    RBC 3.80 (L) 4.14 - 5.80 10*6/mm3    Hemoglobin 11.5 (L) 13.0 - 17.7 g/dL    Hematocrit 34.9 (L) 37.5 - 51.0 %    MCV 91.8 79.0 - 97.0 fL    MCH 30.3 26.6 - 33.0 pg    MCHC 33.0 31.5 - 35.7 g/dL    RDW 12.6 12.3 - 15.4 %    RDW-SD 41.2 37.0 - 54.0 fl    MPV 10.7 6.0 - 12.0 fL    Platelets 284 140 - 450 10*3/mm3    Neutrophil % 63.1 42.7 - 76.0 %    Lymphocyte % 26.0 19.6 - 45.3 %    Monocyte % 8.1 5.0 - 12.0 %    Eosinophil % 2.2 0.3 - 6.2 %    Basophil % 0.5 0.0 - 1.5 %    Immature Grans % 0.1 0.0 - 0.5 %    Neutrophils, Absolute 4.86 1.70 - 7.00 10*3/mm3    Lymphocytes, Absolute 2.00 0.70 - 3.10 10*3/mm3    Monocytes, Absolute 0.62 0.10 - 0.90 10*3/mm3    Eosinophils, Absolute 0.17 0.00 - 0.40 10*3/mm3    Basophils, Absolute 0.04 0.00 - 0.20 10*3/mm3    Immature Grans, Absolute 0.01 0.00 - 0.05 10*3/mm3    nRBC 0.0 0.0 - 0.2 /100 WBC   BNP    Collection Time: 12/14/21 10:00 PM    Specimen: Blood   Result Value Ref Range    proBNP 1,627.0 (H) 0.0 - 450.0 pg/mL   Troponin    Collection Time: 12/14/21 10:00 PM    Specimen: Blood   Result Value Ref Range    Troponin T <0.010 0.000 - 0.030  ng/mL   COVID-19,[facility] IN-HOUSE CEPHEID/[facility] NP SWAB IN TRANSPORT MEDIA 8-12 HR TAT - Swab, Nasopharynx    Collection Time: 12/14/21 10:39 PM    Specimen: Nasopharynx; Swab   Result Value Ref Range    COVID19 Not Detected Not Detected - Ref. Range       Ordered the above labs and independently reviewed the results.        RADIOLOGY  XR Chest 1 View    Result Date: 12/14/2021  SINGLE VIEW OF THE CHEST  HISTORY: Severe  COMPARISON: None available.  FINDINGS: Cardiomegaly is present. There may be some vascular congestion. No pneumothorax or pleural effusion is seen. No acute infiltrates are identified.      Cardiomegaly with suspected vascular congestion.  This report was finalized on 12/14/2021 10:25 PM by Dr. Eli Yeboah M.D.      CT Angiogram Chest    Result Date: 12/14/2021  CT ANGIOGRAM OF THE CHEST  HISTORY: Tachycardia  COMPARISON: None available.  TECHNIQUE: Axial CT imaging was obtained through the thorax. IV contrast was administered. Three-D reformatted images were obtained.  FINDINGS: Exam is degraded by poor timing of the contrast bolus, as well as respiratory artifact and the patient's body habitus. An obvious pulmonary thromboembolus is not seen. The heart is enlarged. There is interlobular septal thickening. There are small bilateral pleural effusions, left greater than right. Constellation of findings is suspicious for congestive heart failure. Thyroid gland, trachea, esophagus appear unremarkable. Thoracic aorta measures within normal size limits. There is no evidence of dissection. No acute abnormalities are seen within the upper abdomen. No acute osseous abnormalities are seen. There are some prominent mediastinal and hilar lymph nodes. For example, a subcarinal node measures up to 1.9 cm. Right hilar node measures up to 2.1 cm. Short-term CT follow-up in 3 months is suggested. There does appear to be some body wall edema.      Cardiomegaly, interlobular septal thickening, and small bilateral  pleural effusions, suspicious for congestive heart failure.  Radiation dose reduction techniques were utilized, including automated exposure control and exposure modulation based on body size.  This report was finalized on 12/14/2021 11:53 PM by Dr. Eli Yeboah M.D.        I ordered the above noted radiological studies. Reviewed by me and discussed with radiologist.  See dictation for official radiology interpretation.      PROCEDURES    Procedures      MEDICATIONS GIVEN IN ER    Medications   labetalol (NORMODYNE,TRANDATE) injection 20 mg (has no administration in time range)   sodium chloride 0.9 % flush 10 mL (has no administration in time range)   nitroglycerin (NITROSTAT) SL tablet 0.4 mg (has no administration in time range)   acetaminophen (TYLENOL) tablet 650 mg (has no administration in time range)   ondansetron (ZOFRAN) tablet 4 mg (has no administration in time range)     Or   ondansetron (ZOFRAN) injection 4 mg (has no administration in time range)   aluminum-magnesium hydroxide-simethicone (MAALOX MAX) 400-400-40 MG/5ML suspension 15 mL (has no administration in time range)   furosemide (LASIX) injection 40 mg (40 mg Intravenous Given 12/14/21 2251)   sodium chloride 0.9 % bolus 500 mL (500 mL Intravenous New Bag 12/14/21 2253)   labetalol (NORMODYNE,TRANDATE) injection 20 mg (20 mg Intravenous Given 12/14/21 2253)   iopamidol (ISOVUE-370) 76 % injection 100 mL (95 mL Intravenous Given 12/14/21 2318)         PROGRESS, DATA ANALYSIS, CONSULTS, AND MEDICAL DECISION MAKING    All labs have been independently reviewed by me.  All radiology studies have been reviewed by me and discussed with radiologist dictating the report.   EKG's independently viewed and interpreted by me.  Discussion below represents my analysis of pertinent findings related to patient's condition, differential diagnosis, treatment plan and final disposition.    DDx includes but is not limited to: Edema secondary to renal cause,  edema secondary to hepatic cause, CHF, venous insufficiency.  Will obtain CBC, CMP, troponin, portable chest x-ray, BNP to further evaluate the patient.  Given the tachycardia and the reported shortness of breath and the lower extremity edema, if the portable chest x-ray is unremarkable will obtain an angiogram of the chest to rule out blood clot.  Please see below for MDM course care.    ED Course as of 12/15/21 0028   Tue Dec 14, 2021   2300 Patient CBC has been reviewed and unremarkable.  CMP shows an albumin of 1.7.  Patient's creatinine is 1.66 and his GFR is 54.  His picture is more consistent with anasarca.  Suspect this is most likely renal.  However, at this stage cannot completely rule out cardiac.  I have given the patient IV Lasix.  I feel he would really benefit from mission to the hospital where he can be further evaluated by renal and cardiology as seen fit.  We will place a call to the hospitalist [RC]   Wed Dec 15, 2021   0006 Discussed patient's case with Yamel GARCIA with [facility].  To admit to Dr. Neal's care [RC]      ED Course User Index  [RC] Gerry Melgar III, PA           PPE: The patient wore a surgical mask throughout the entire patient encounter. I wore an N95.    AS OF 00:28 EST VITALS:    BP - (!) 200/106  HR - 96  TEMP - 98.4 °F (36.9 °C) (Infrared)  O2 SATS - 99%        DIAGNOSIS  Final diagnoses:   Hypoalbuminemia   Elevated serum creatinine   Elevated blood pressure reading   Anasarca         DISPOSITION  ADMISSION    Discussed treatment plan and reason for admission with pt/family and admitting physician.  Pt/family voiced understanding of the plan for admission for further testing/treatment as needed.              Gerry Melgar III, PA  12/15/21 0028

## 2021-12-16 ENCOUNTER — APPOINTMENT (OUTPATIENT)
Dept: CARDIOLOGY | Facility: HOSPITAL | Age: 46
End: 2021-12-16

## 2021-12-16 PROBLEM — E55.9 VITAMIN D DEFICIENCY: Status: ACTIVE | Noted: 2021-12-16

## 2021-12-16 LAB
25(OH)D3 SERPL-MCNC: 9.6 NG/ML (ref 30–100)
ALBUMIN SERPL-MCNC: 1.6 G/DL (ref 3.5–5.2)
ALBUMIN SERPL-MCNC: 2 G/DL (ref 3.5–5.2)
ALBUMIN/GLOB SERPL: 0.5 G/DL
ALBUMIN/GLOB SERPL: 1 G/DL
ALP SERPL-CCNC: 74 U/L (ref 39–117)
ALP SERPL-CCNC: 80 U/L (ref 39–117)
ALT SERPL W P-5'-P-CCNC: 10 U/L (ref 1–41)
ALT SERPL W P-5'-P-CCNC: 11 U/L (ref 1–41)
ANION GAP SERPL CALCULATED.3IONS-SCNC: 6 MMOL/L (ref 5–15)
ANION GAP SERPL CALCULATED.3IONS-SCNC: 6.4 MMOL/L (ref 5–15)
AORTIC DIMENSIONLESS INDEX: 0.6 (DI)
AST SERPL-CCNC: 10 U/L (ref 1–40)
AST SERPL-CCNC: 13 U/L (ref 1–40)
BASOPHILS # BLD AUTO: 0.03 10*3/MM3 (ref 0–0.2)
BASOPHILS NFR BLD AUTO: 0.4 % (ref 0–1.5)
BH CV ECHO MEAS - AO MAX PG (FULL): 6.6 MMHG
BH CV ECHO MEAS - AO MAX PG: 9.6 MMHG
BH CV ECHO MEAS - AO MEAN PG (FULL): 3.3 MMHG
BH CV ECHO MEAS - AO MEAN PG: 5.3 MMHG
BH CV ECHO MEAS - AO V2 MAX: 155.1 CM/SEC
BH CV ECHO MEAS - AO V2 MEAN: 106.1 CM/SEC
BH CV ECHO MEAS - AO V2 VTI: 30 CM
BH CV ECHO MEAS - ASC AORTA: 3.3 CM
BH CV ECHO MEAS - AVA(I,A): 2.6 CM^2
BH CV ECHO MEAS - AVA(I,D): 2.6 CM^2
BH CV ECHO MEAS - AVA(V,A): 2.4 CM^2
BH CV ECHO MEAS - AVA(V,D): 2.4 CM^2
BH CV ECHO MEAS - BSA(HAYCOCK): 3.2 M^2
BH CV ECHO MEAS - BSA: 2.9 M^2
BH CV ECHO MEAS - BZI_BMI: 50 KILOGRAMS/M^2
BH CV ECHO MEAS - BZI_METRIC_HEIGHT: 190.5 CM
BH CV ECHO MEAS - BZI_METRIC_WEIGHT: 181.4 KG
BH CV ECHO MEAS - DIST REN A EDV LEFT: 26 CM/SEC
BH CV ECHO MEAS - DIST REN A PSV LEFT: 97 CM/SEC
BH CV ECHO MEAS - DIST REN A RI LEFT: 0.72
BH CV ECHO MEAS - EDV(CUBED): 130.7 ML
BH CV ECHO MEAS - EDV(MOD-SP2): 196 ML
BH CV ECHO MEAS - EDV(MOD-SP4): 229 ML
BH CV ECHO MEAS - EDV(TEICH): 122.4 ML
BH CV ECHO MEAS - EF(CUBED): 63.8 %
BH CV ECHO MEAS - EF(MOD-BP): 56.2 %
BH CV ECHO MEAS - EF(MOD-SP2): 53.1 %
BH CV ECHO MEAS - EF(MOD-SP4): 56.8 %
BH CV ECHO MEAS - EF(TEICH): 55 %
BH CV ECHO MEAS - ESV(CUBED): 47.3 ML
BH CV ECHO MEAS - ESV(MOD-SP2): 92 ML
BH CV ECHO MEAS - ESV(MOD-SP4): 99 ML
BH CV ECHO MEAS - ESV(TEICH): 55 ML
BH CV ECHO MEAS - FS: 28.7 %
BH CV ECHO MEAS - IVS/LVPW: 1
BH CV ECHO MEAS - IVSD: 2.1 CM
BH CV ECHO MEAS - LAT PEAK E' VEL: 6.7 CM/SEC
BH CV ECHO MEAS - LV DIASTOLIC VOL/BSA (35-75): 77.7 ML/M^2
BH CV ECHO MEAS - LV MASS(C)D: 519.4 GRAMS
BH CV ECHO MEAS - LV MASS(C)DI: 176.3 GRAMS/M^2
BH CV ECHO MEAS - LV MAX PG: 3.1 MMHG
BH CV ECHO MEAS - LV MEAN PG: 2 MMHG
BH CV ECHO MEAS - LV SYSTOLIC VOL/BSA (12-30): 33.6 ML/M^2
BH CV ECHO MEAS - LV V1 MAX: 87.4 CM/SEC
BH CV ECHO MEAS - LV V1 MEAN: 67.5 CM/SEC
BH CV ECHO MEAS - LV V1 VTI: 18 CM
BH CV ECHO MEAS - LVIDD: 5.1 CM
BH CV ECHO MEAS - LVIDS: 3.6 CM
BH CV ECHO MEAS - LVLD AP2: 9.4 CM
BH CV ECHO MEAS - LVLD AP4: 9.8 CM
BH CV ECHO MEAS - LVLS AP2: 8.5 CM
BH CV ECHO MEAS - LVLS AP4: 8.3 CM
BH CV ECHO MEAS - LVOT AREA (M): 4.2 CM^2
BH CV ECHO MEAS - LVOT AREA: 4.3 CM^2
BH CV ECHO MEAS - LVOT DIAM: 2.3 CM
BH CV ECHO MEAS - LVPWD: 2 CM
BH CV ECHO MEAS - MED PEAK E' VEL: 7.6 CM/SEC
BH CV ECHO MEAS - MID REN A EDV LEFT: 23 CM/SEC
BH CV ECHO MEAS - MID REN A PSV LEFT: 78 CM/SEC
BH CV ECHO MEAS - MID REN A RI LEFT: 0.69
BH CV ECHO MEAS - MV A DUR: 0.09 SEC
BH CV ECHO MEAS - MV A MAX VEL: 100.7 CM/SEC
BH CV ECHO MEAS - MV DEC SLOPE: 492.4 CM/SEC^2
BH CV ECHO MEAS - MV DEC TIME: 144 SEC
BH CV ECHO MEAS - MV E MAX VEL: 74.1 CM/SEC
BH CV ECHO MEAS - MV E/A: 0.74
BH CV ECHO MEAS - MV MAX PG: 5.5 MMHG
BH CV ECHO MEAS - MV MEAN PG: 2.1 MMHG
BH CV ECHO MEAS - MV P1/2T MAX VEL: 76 CM/SEC
BH CV ECHO MEAS - MV P1/2T: 45.2 MSEC
BH CV ECHO MEAS - MV V2 MAX: 117.1 CM/SEC
BH CV ECHO MEAS - MV V2 MEAN: 66.1 CM/SEC
BH CV ECHO MEAS - MV V2 VTI: 19.9 CM
BH CV ECHO MEAS - MVA P1/2T LCG: 2.9 CM^2
BH CV ECHO MEAS - MVA(P1/2T): 4.9 CM^2
BH CV ECHO MEAS - MVA(VTI): 3.9 CM^2
BH CV ECHO MEAS - PA ACC TIME: 0.13 SEC
BH CV ECHO MEAS - PA MAX PG (FULL): 1.9 MMHG
BH CV ECHO MEAS - PA MAX PG: 6.1 MMHG
BH CV ECHO MEAS - PA PR(ACCEL): 18.4 MMHG
BH CV ECHO MEAS - PA V2 MAX: 123.5 CM/SEC
BH CV ECHO MEAS - PROX REN A EDV LEFT: 17 CM/SEC
BH CV ECHO MEAS - PROX REN A PSV LEFT: 72 CM/SEC
BH CV ECHO MEAS - PROX REN A RI LEFT: 0.75
BH CV ECHO MEAS - PVA(V,A): 3.9 CM^2
BH CV ECHO MEAS - PVA(V,D): 3.9 CM^2
BH CV ECHO MEAS - QP/QS: 1.2
BH CV ECHO MEAS - RAP SYSTOLE: 3 MMHG
BH CV ECHO MEAS - RV MAX PG: 4.2 MMHG
BH CV ECHO MEAS - RV MEAN PG: 2.3 MMHG
BH CV ECHO MEAS - RV V1 MAX: 102.8 CM/SEC
BH CV ECHO MEAS - RV V1 MEAN: 70.2 CM/SEC
BH CV ECHO MEAS - RV V1 VTI: 19.5 CM
BH CV ECHO MEAS - RVOT AREA: 4.7 CM^2
BH CV ECHO MEAS - RVOT DIAM: 2.4 CM
BH CV ECHO MEAS - RVSP: 33.4 MMHG
BH CV ECHO MEAS - SI(CUBED): 28.3 ML/M^2
BH CV ECHO MEAS - SI(LVOT): 26 ML/M^2
BH CV ECHO MEAS - SI(MOD-SP2): 35.3 ML/M^2
BH CV ECHO MEAS - SI(MOD-SP4): 44.1 ML/M^2
BH CV ECHO MEAS - SI(TEICH): 22.9 ML/M^2
BH CV ECHO MEAS - SV(CUBED): 83.4 ML
BH CV ECHO MEAS - SV(LVOT): 76.6 ML
BH CV ECHO MEAS - SV(MOD-SP2): 104 ML
BH CV ECHO MEAS - SV(MOD-SP4): 130 ML
BH CV ECHO MEAS - SV(RVOT): 91.1 ML
BH CV ECHO MEAS - SV(TEICH): 67.4 ML
BH CV ECHO MEAS - TAPSE (>1.6): 3.3 CM
BH CV ECHO MEAS - TR MAX VEL: 275.5 CM/SEC
BH CV ECHO MEASUREMENTS AVERAGE E/E' RATIO: 10.36
BH CV LOWER VASCULAR LEFT COMMON FEMORAL AUGMENT: NORMAL
BH CV LOWER VASCULAR LEFT COMMON FEMORAL COMPETENT: NORMAL
BH CV LOWER VASCULAR LEFT COMMON FEMORAL COMPRESS: NORMAL
BH CV LOWER VASCULAR LEFT COMMON FEMORAL PHASIC: NORMAL
BH CV LOWER VASCULAR LEFT COMMON FEMORAL SPONT: NORMAL
BH CV LOWER VASCULAR LEFT DISTAL FEMORAL COMPRESS: NORMAL
BH CV LOWER VASCULAR LEFT GASTRONEMIUS COMPRESS: NORMAL
BH CV LOWER VASCULAR LEFT GREATER SAPH AK COMPRESS: NORMAL
BH CV LOWER VASCULAR LEFT GREATER SAPH BK COMPRESS: NORMAL
BH CV LOWER VASCULAR LEFT LESSER SAPH COMPRESS: NORMAL
BH CV LOWER VASCULAR LEFT MID FEMORAL AUGMENT: NORMAL
BH CV LOWER VASCULAR LEFT MID FEMORAL COMPETENT: NORMAL
BH CV LOWER VASCULAR LEFT MID FEMORAL COMPRESS: NORMAL
BH CV LOWER VASCULAR LEFT MID FEMORAL PHASIC: NORMAL
BH CV LOWER VASCULAR LEFT MID FEMORAL SPONT: NORMAL
BH CV LOWER VASCULAR LEFT PERONEAL COMPRESS: NORMAL
BH CV LOWER VASCULAR LEFT POPLITEAL AUGMENT: NORMAL
BH CV LOWER VASCULAR LEFT POPLITEAL COMPETENT: NORMAL
BH CV LOWER VASCULAR LEFT POPLITEAL COMPRESS: NORMAL
BH CV LOWER VASCULAR LEFT POPLITEAL PHASIC: NORMAL
BH CV LOWER VASCULAR LEFT POPLITEAL SPONT: NORMAL
BH CV LOWER VASCULAR LEFT POSTERIOR TIBIAL COMPRESS: NORMAL
BH CV LOWER VASCULAR LEFT PROFUNDA FEMORAL COMPRESS: NORMAL
BH CV LOWER VASCULAR LEFT PROXIMAL FEMORAL COMPRESS: NORMAL
BH CV LOWER VASCULAR LEFT SAPHENOFEMORAL JUNCTION COMPRESS: NORMAL
BH CV LOWER VASCULAR RIGHT COMMON FEMORAL AUGMENT: NORMAL
BH CV LOWER VASCULAR RIGHT COMMON FEMORAL COMPETENT: NORMAL
BH CV LOWER VASCULAR RIGHT COMMON FEMORAL COMPRESS: NORMAL
BH CV LOWER VASCULAR RIGHT COMMON FEMORAL PHASIC: NORMAL
BH CV LOWER VASCULAR RIGHT COMMON FEMORAL SPONT: NORMAL
BH CV LOWER VASCULAR RIGHT DISTAL FEMORAL COMPRESS: NORMAL
BH CV LOWER VASCULAR RIGHT GASTRONEMIUS COMPRESS: NORMAL
BH CV LOWER VASCULAR RIGHT GREATER SAPH AK COMPRESS: NORMAL
BH CV LOWER VASCULAR RIGHT GREATER SAPH BK COMPRESS: NORMAL
BH CV LOWER VASCULAR RIGHT LESSER SAPH COMPRESS: NORMAL
BH CV LOWER VASCULAR RIGHT MID FEMORAL AUGMENT: NORMAL
BH CV LOWER VASCULAR RIGHT MID FEMORAL COMPETENT: NORMAL
BH CV LOWER VASCULAR RIGHT MID FEMORAL COMPRESS: NORMAL
BH CV LOWER VASCULAR RIGHT MID FEMORAL PHASIC: NORMAL
BH CV LOWER VASCULAR RIGHT MID FEMORAL SPONT: NORMAL
BH CV LOWER VASCULAR RIGHT PERONEAL COMPRESS: NORMAL
BH CV LOWER VASCULAR RIGHT POPLITEAL AUGMENT: NORMAL
BH CV LOWER VASCULAR RIGHT POPLITEAL COMPETENT: NORMAL
BH CV LOWER VASCULAR RIGHT POPLITEAL COMPRESS: NORMAL
BH CV LOWER VASCULAR RIGHT POPLITEAL PHASIC: NORMAL
BH CV LOWER VASCULAR RIGHT POPLITEAL SPONT: NORMAL
BH CV LOWER VASCULAR RIGHT POSTERIOR TIBIAL COMPRESS: NORMAL
BH CV LOWER VASCULAR RIGHT PROFUNDA FEMORAL COMPRESS: NORMAL
BH CV LOWER VASCULAR RIGHT PROXIMAL FEMORAL COMPRESS: NORMAL
BH CV LOWER VASCULAR RIGHT SAPHENOFEMORAL JUNCTION COMPRESS: NORMAL
BH CV VAS BP LEFT ARM: NORMAL MMHG
BH CV VAS BP RIGHT ARM: NORMAL MMHG
BH CV VAS RENAL AORTIC MID PSV: 126 CM/S
BH CV VAS RENAL HILUM LEFT EDV: 14 CM/S
BH CV VAS RENAL HILUM LEFT PSV: 48 CM/S
BH CV VAS RENAL HILUM RIGHT EDV: 11.2 CM/S
BH CV VAS RENAL HILUM RIGHT PSV: 30.3 CM/S
BH CV XLRA - RV BASE: 3.9 CM
BH CV XLRA - RV LENGTH: 7.4 CM
BH CV XLRA - RV MID: 3.5 CM
BH CV XLRA - TDI S': 16.3 CM/SEC
BH CV XLRA MEAS - KID L LEFT: 11.8 CM
BH CV XLRA MEAS - RENAL A ORG RI LEFT: 0.76
BH CV XLRA MEAS DIST REN A EDV RIGHT: 18 CM/S
BH CV XLRA MEAS DIST REN A PSV RIGHT: 62 CM/S
BH CV XLRA MEAS KID L RIGHT: 11.6 CM
BH CV XLRA MEAS KID W RIGHT: 5.7 CM
BH CV XLRA MEAS MID REN A EDV RIGHT: 38 CM/SEC
BH CV XLRA MEAS MID REN A PSV RIGHT: 130 CM/SEC
BH CV XLRA MEAS MID REN A RI RIGHT: 0.7
BH CV XLRA MEAS PROX REN A EDV RIGHT: 25 CM/SEC
BH CV XLRA MEAS PROX REN A PSV RIGHT: 119 CM/SEC
BH CV XLRA MEAS PROX REN A RI RIGHT: 0.78
BH CV XLRA MEAS RAR LEFT: 0.76
BH CV XLRA MEAS RAR RIGHT: 1.03
BH CV XLRA MEAS RENAL A ORG EDV LEFT: 15 CM/SEC
BH CV XLRA MEAS RENAL A ORG EDV RIGHT: 22 CM/SEC
BH CV XLRA MEAS RENAL A ORG PSV LEFT: 67 CM/SEC
BH CV XLRA MEAS RENAL A ORG PSV RIGHT: 116 CM/SEC
BH CV XLRA MEAS RENAL A ORG RI RIGHT: 0.8
BILIRUB SERPL-MCNC: 0.2 MG/DL (ref 0–1.2)
BILIRUB SERPL-MCNC: 0.2 MG/DL (ref 0–1.2)
BUN SERPL-MCNC: 12 MG/DL (ref 6–20)
BUN SERPL-MCNC: 13 MG/DL (ref 6–20)
BUN/CREAT SERPL: 7.2 (ref 7–25)
BUN/CREAT SERPL: 9.4 (ref 7–25)
CALCIUM SPEC-SCNC: 7.7 MG/DL (ref 8.6–10.5)
CALCIUM SPEC-SCNC: 7.9 MG/DL (ref 8.6–10.5)
CHLORIDE SERPL-SCNC: 107 MMOL/L (ref 98–107)
CHLORIDE SERPL-SCNC: 108 MMOL/L (ref 98–107)
CHOLEST SERPL-MCNC: 296 MG/DL (ref 0–200)
CO2 SERPL-SCNC: 26 MMOL/L (ref 22–29)
CO2 SERPL-SCNC: 26.6 MMOL/L (ref 22–29)
CREAT SERPL-MCNC: 1.38 MG/DL (ref 0.76–1.27)
CREAT SERPL-MCNC: 1.66 MG/DL (ref 0.76–1.27)
DEPRECATED RDW RBC AUTO: 42.3 FL (ref 37–54)
EOSINOPHIL # BLD AUTO: 0.12 10*3/MM3 (ref 0–0.4)
EOSINOPHIL NFR BLD AUTO: 1.5 % (ref 0.3–6.2)
ERYTHROCYTE [DISTWIDTH] IN BLOOD BY AUTOMATED COUNT: 12.8 % (ref 12.3–15.4)
FERRITIN SERPL-MCNC: 159 NG/ML (ref 30–400)
FOLATE SERPL-MCNC: 7.33 NG/ML (ref 4.78–24.2)
GFR SERPL CREATININE-BSD FRML MDRD: 54 ML/MIN/1.73
GFR SERPL CREATININE-BSD FRML MDRD: 67 ML/MIN/1.73
GLOBULIN UR ELPH-MCNC: 2 GM/DL
GLOBULIN UR ELPH-MCNC: 3.2 GM/DL
GLUCOSE SERPL-MCNC: 94 MG/DL (ref 65–99)
GLUCOSE SERPL-MCNC: 97 MG/DL (ref 65–99)
HCT VFR BLD AUTO: 34.3 % (ref 37.5–51)
HDLC SERPL-MCNC: 50 MG/DL (ref 40–60)
HEMOCCULT STL QL: NEGATIVE
HGB BLD-MCNC: 11.4 G/DL (ref 13–17.7)
HIV1+2 AB SER QL: NORMAL
IMM GRANULOCYTES # BLD AUTO: 0.02 10*3/MM3 (ref 0–0.05)
IMM GRANULOCYTES NFR BLD AUTO: 0.2 % (ref 0–0.5)
IRON 24H UR-MRATE: 38 MCG/DL (ref 59–158)
IRON SATN MFR SERPL: 24 % (ref 20–50)
LDLC SERPL CALC-MCNC: 209 MG/DL (ref 0–100)
LDLC/HDLC SERPL: 4.16 {RATIO}
LEFT ATRIUM VOLUME INDEX: 27 ML/M2
LEFT KIDNEY WIDTH: 6.08 CM
LEFT RENAL UPPER PARENCHYMA MAX: 34 CM/S
LEFT RENAL UPPER PARENCHYMA MIN: 11 CM/S
LEFT RENAL UPPER PARENCHYMA RI: 0.68
LV EF 2D ECHO EST: 56 %
LYMPHOCYTES # BLD AUTO: 1.84 10*3/MM3 (ref 0.7–3.1)
LYMPHOCYTES NFR BLD AUTO: 22.5 % (ref 19.6–45.3)
MAGNESIUM SERPL-MCNC: 1.9 MG/DL (ref 1.6–2.6)
MAXIMAL PREDICTED HEART RATE: 172 BPM
MCH RBC QN AUTO: 30.2 PG (ref 26.6–33)
MCHC RBC AUTO-ENTMCNC: 33.2 G/DL (ref 31.5–35.7)
MCV RBC AUTO: 90.7 FL (ref 79–97)
MONOCYTES # BLD AUTO: 0.6 10*3/MM3 (ref 0.1–0.9)
MONOCYTES NFR BLD AUTO: 7.3 % (ref 5–12)
NEUTROPHILS NFR BLD AUTO: 5.56 10*3/MM3 (ref 1.7–7)
NEUTROPHILS NFR BLD AUTO: 68.1 % (ref 42.7–76)
NRBC BLD AUTO-RTO: 0 /100 WBC (ref 0–0.2)
PLATELET # BLD AUTO: 290 10*3/MM3 (ref 140–450)
PMV BLD AUTO: 10.7 FL (ref 6–12)
POTASSIUM SERPL-SCNC: 3.3 MMOL/L (ref 3.5–5.2)
POTASSIUM SERPL-SCNC: 3.6 MMOL/L (ref 3.5–5.2)
PROT SERPL-MCNC: 4 G/DL (ref 6–8.5)
PROT SERPL-MCNC: 4.8 G/DL (ref 6–8.5)
PTH-INTACT SERPL-MCNC: 69.9 PG/ML (ref 15–65)
RBC # BLD AUTO: 3.78 10*6/MM3 (ref 4.14–5.8)
RIGHT RENAL UPPER PARENCHYMA MAX: 19.5 CM/S
RIGHT RENAL UPPER PARENCHYMA MIN: 7.88 CM/S
RIGHT RENAL UPPER PARENCHYMA RI: 0.6
RPR SER QL: NORMAL
SODIUM SERPL-SCNC: 140 MMOL/L (ref 136–145)
SODIUM SERPL-SCNC: 140 MMOL/L (ref 136–145)
STRESS TARGET HR: 146 BPM
TIBC SERPL-MCNC: 159 MCG/DL (ref 298–536)
TRANSFERRIN SERPL-MCNC: 107 MG/DL (ref 200–360)
TRIGL SERPL-MCNC: 191 MG/DL (ref 0–150)
VIT B12 BLD-MCNC: 351 PG/ML (ref 211–946)
VLDLC SERPL-MCNC: 37 MG/DL (ref 5–40)
WBC NRBC COR # BLD: 8.17 10*3/MM3 (ref 3.4–10.8)

## 2021-12-16 PROCEDURE — G0432 EIA HIV-1/HIV-2 SCREEN: HCPCS | Performed by: INTERNAL MEDICINE

## 2021-12-16 PROCEDURE — 93970 EXTREMITY STUDY: CPT

## 2021-12-16 PROCEDURE — 82306 VITAMIN D 25 HYDROXY: CPT | Performed by: INTERNAL MEDICINE

## 2021-12-16 PROCEDURE — 86431 RHEUMATOID FACTOR QUANT: CPT | Performed by: INTERNAL MEDICINE

## 2021-12-16 PROCEDURE — 83540 ASSAY OF IRON: CPT | Performed by: INTERNAL MEDICINE

## 2021-12-16 PROCEDURE — 86256 FLUORESCENT ANTIBODY TITER: CPT | Performed by: INTERNAL MEDICINE

## 2021-12-16 PROCEDURE — 86160 COMPLEMENT ANTIGEN: CPT | Performed by: INTERNAL MEDICINE

## 2021-12-16 PROCEDURE — 86592 SYPHILIS TEST NON-TREP QUAL: CPT | Performed by: INTERNAL MEDICINE

## 2021-12-16 PROCEDURE — 86038 ANTINUCLEAR ANTIBODIES: CPT | Performed by: INTERNAL MEDICINE

## 2021-12-16 PROCEDURE — 25010000002 HYDRALAZINE PER 20 MG: Performed by: NURSE PRACTITIONER

## 2021-12-16 PROCEDURE — 82607 VITAMIN B-12: CPT | Performed by: HOSPITALIST

## 2021-12-16 PROCEDURE — 83735 ASSAY OF MAGNESIUM: CPT | Performed by: HOSPITALIST

## 2021-12-16 PROCEDURE — 80053 COMPREHEN METABOLIC PANEL: CPT | Performed by: HOSPITALIST

## 2021-12-16 PROCEDURE — 80053 COMPREHEN METABOLIC PANEL: CPT | Performed by: INTERNAL MEDICINE

## 2021-12-16 PROCEDURE — 80061 LIPID PANEL: CPT | Performed by: NURSE PRACTITIONER

## 2021-12-16 PROCEDURE — 93356 MYOCRD STRAIN IMG SPCKL TRCK: CPT

## 2021-12-16 PROCEDURE — 82272 OCCULT BLD FECES 1-3 TESTS: CPT | Performed by: HOSPITALIST

## 2021-12-16 PROCEDURE — 83970 ASSAY OF PARATHORMONE: CPT | Performed by: INTERNAL MEDICINE

## 2021-12-16 PROCEDURE — 84166 PROTEIN E-PHORESIS/URINE/CSF: CPT | Performed by: NURSE PRACTITIONER

## 2021-12-16 PROCEDURE — 82728 ASSAY OF FERRITIN: CPT | Performed by: HOSPITALIST

## 2021-12-16 PROCEDURE — 86335 IMMUNFIX E-PHORSIS/URINE/CSF: CPT | Performed by: INTERNAL MEDICINE

## 2021-12-16 PROCEDURE — 84156 ASSAY OF PROTEIN URINE: CPT | Performed by: NURSE PRACTITIONER

## 2021-12-16 PROCEDURE — 83520 IMMUNOASSAY QUANT NOS NONAB: CPT | Performed by: INTERNAL MEDICINE

## 2021-12-16 PROCEDURE — 25010000002 PERFLUTREN (DEFINITY) 8.476 MG IN SODIUM CHLORIDE (PF) 0.9 % 10 ML INJECTION: Performed by: NURSE PRACTITIONER

## 2021-12-16 PROCEDURE — 84466 ASSAY OF TRANSFERRIN: CPT | Performed by: INTERNAL MEDICINE

## 2021-12-16 PROCEDURE — 82088 ASSAY OF ALDOSTERONE: CPT | Performed by: INTERNAL MEDICINE

## 2021-12-16 PROCEDURE — 84165 PROTEIN E-PHORESIS SERUM: CPT | Performed by: INTERNAL MEDICINE

## 2021-12-16 PROCEDURE — 85025 COMPLETE CBC W/AUTO DIFF WBC: CPT | Performed by: HOSPITALIST

## 2021-12-16 PROCEDURE — 93975 VASCULAR STUDY: CPT

## 2021-12-16 PROCEDURE — 93356 MYOCRD STRAIN IMG SPCKL TRCK: CPT | Performed by: INTERNAL MEDICINE

## 2021-12-16 PROCEDURE — 93306 TTE W/DOPPLER COMPLETE: CPT | Performed by: INTERNAL MEDICINE

## 2021-12-16 PROCEDURE — 82746 ASSAY OF FOLIC ACID SERUM: CPT | Performed by: HOSPITALIST

## 2021-12-16 PROCEDURE — 83516 IMMUNOASSAY NONANTIBODY: CPT | Performed by: INTERNAL MEDICINE

## 2021-12-16 PROCEDURE — 83883 ASSAY NEPHELOMETRY NOT SPEC: CPT | Performed by: INTERNAL MEDICINE

## 2021-12-16 PROCEDURE — 84244 ASSAY OF RENIN: CPT | Performed by: INTERNAL MEDICINE

## 2021-12-16 PROCEDURE — 93306 TTE W/DOPPLER COMPLETE: CPT

## 2021-12-16 RX ORDER — ERGOCALCIFEROL 1.25 MG/1
50000 CAPSULE ORAL
Status: DISCONTINUED | OUTPATIENT
Start: 2021-12-16 | End: 2021-12-17

## 2021-12-16 RX ADMIN — PERFLUTREN 3 ML: 6.52 INJECTION, SUSPENSION INTRAVENOUS at 10:32

## 2021-12-16 RX ADMIN — BUMETANIDE 2 MG: 0.25 INJECTION INTRAMUSCULAR; INTRAVENOUS at 04:30

## 2021-12-16 RX ADMIN — CARVEDILOL 6.25 MG: 6.25 TABLET, FILM COATED ORAL at 11:19

## 2021-12-16 RX ADMIN — HYDRALAZINE HYDROCHLORIDE 10 MG: 20 INJECTION INTRAMUSCULAR; INTRAVENOUS at 12:50

## 2021-12-16 RX ADMIN — AMLODIPINE BESYLATE 5 MG: 5 TABLET ORAL at 11:19

## 2021-12-16 RX ADMIN — BUMETANIDE 2 MG: 0.25 INJECTION INTRAMUSCULAR; INTRAVENOUS at 22:11

## 2021-12-16 RX ADMIN — CARVEDILOL 6.25 MG: 6.25 TABLET, FILM COATED ORAL at 18:17

## 2021-12-16 RX ADMIN — ERGOCALCIFEROL 50000 UNITS: 1.25 CAPSULE ORAL at 18:17

## 2021-12-16 RX ADMIN — BUMETANIDE 2 MG: 0.25 INJECTION INTRAMUSCULAR; INTRAVENOUS at 12:50

## 2021-12-16 RX ADMIN — SODIUM CHLORIDE, PRESERVATIVE FREE 10 ML: 5 INJECTION INTRAVENOUS at 11:20

## 2021-12-16 NOTE — PLAN OF CARE
Problem: Adult Inpatient Plan of Care  Goal: Plan of Care Review  Outcome: Ongoing, Progressing  Flowsheets (Taken 12/16/2021 0633)  Outcome Summary: Pt A&Ox4, VSS with the exception of BP as documented treated per MAR. Up ad ric. 24 hour urine collection initiated at 2244. Pt denies any complaints at this time.  Goal: Patient-Specific Goal (Individualized)  Outcome: Ongoing, Progressing  Goal: Absence of Hospital-Acquired Illness or Injury  Outcome: Ongoing, Progressing  Intervention: Identify and Manage Fall Risk  Recent Flowsheet Documentation  Taken 12/16/2021 0612 by Anne Aguilera, MITESH  Safety Promotion/Fall Prevention:   assistive device/personal items within reach   clutter free environment maintained   fall prevention program maintained   nonskid shoes/slippers when out of bed   room organization consistent   safety round/check completed  Taken 12/16/2021 0430 by Anne Aguilera, MITESH  Safety Promotion/Fall Prevention:   assistive device/personal items within reach   clutter free environment maintained   fall prevention program maintained   nonskid shoes/slippers when out of bed   room organization consistent   safety round/check completed  Taken 12/16/2021 0215 by Anne Aguilera, MITESH  Safety Promotion/Fall Prevention:   assistive device/personal items within reach   clutter free environment maintained   fall prevention program maintained   nonskid shoes/slippers when out of bed   room organization consistent   safety round/check completed  Taken 12/16/2021 0030 by Anne Aguilera, MITESH  Safety Promotion/Fall Prevention:   assistive device/personal items within reach   clutter free environment maintained   fall prevention program maintained   nonskid shoes/slippers when out of bed   room organization consistent   safety round/check completed  Taken 12/15/2021 2200 by Anne Aguilera, MITESH  Safety Promotion/Fall Prevention:   assistive device/personal items within reach   clutter free environment maintained   fall  prevention program maintained   nonskid shoes/slippers when out of bed   room organization consistent   safety round/check completed  Taken 12/15/2021 2145 by Anne Aguilera RN  Safety Promotion/Fall Prevention:   assistive device/personal items within reach   clutter free environment maintained   fall prevention program maintained   nonskid shoes/slippers when out of bed   room organization consistent   safety round/check completed  Taken 12/15/2021 2008 by Anne Augilera RN  Safety Promotion/Fall Prevention:   activity supervised   assistive device/personal items within reach   clutter free environment maintained   fall prevention program maintained   nonskid shoes/slippers when out of bed   room organization consistent   safety round/check completed  Intervention: Prevent Skin Injury  Recent Flowsheet Documentation  Taken 12/16/2021 0612 by Anne Aguilera RN  Body Position: position changed independently  Taken 12/16/2021 0430 by Anne Aguilera RN  Body Position: position changed independently  Taken 12/16/2021 0215 by Anne Aguilera RN  Body Position: position changed independently  Taken 12/16/2021 0030 by Anne Aguilera RN  Body Position: position changed independently  Taken 12/15/2021 2200 by Anne Aguilera RN  Body Position: position changed independently  Taken 12/15/2021 2145 by Anne Aguilera RN  Body Position: position changed independently  Taken 12/15/2021 2008 by Anne Aguilera RN  Body Position: position changed independently  Intervention: Prevent and Manage VTE (venous thromboembolism) Risk  Recent Flowsheet Documentation  Taken 12/15/2021 2145 by Anne Aguilera RN  VTE Prevention/Management:   bilateral   dorsiflexion/plantar flexion performed  Intervention: Prevent Infection  Recent Flowsheet Documentation  Taken 12/16/2021 0612 by Anne Aguilera RN  Infection Prevention:   single patient room provided   rest/sleep promoted  Taken 12/16/2021 0430 by Anne Aguilera RN  Infection Prevention:    rest/sleep promoted   single patient room provided  Taken 12/16/2021 0215 by Anne Aguilera RN  Infection Prevention:   rest/sleep promoted   single patient room provided  Taken 12/16/2021 0030 by Anne Aguilera RN  Infection Prevention:   rest/sleep promoted   single patient room provided  Taken 12/15/2021 2200 by Anne Aguilera RN  Infection Prevention:   rest/sleep promoted   single patient room provided  Taken 12/15/2021 2145 by Anne Aguilera RN  Infection Prevention:   rest/sleep promoted   single patient room provided  Taken 12/15/2021 2008 by Anne Aguilera RN  Infection Prevention:   rest/sleep promoted   single patient room provided  Goal: Optimal Comfort and Wellbeing  Outcome: Ongoing, Progressing  Intervention: Provide Person-Centered Care  Recent Flowsheet Documentation  Taken 12/16/2021 0030 by Anne Aguilera RN  Trust Relationship/Rapport: thoughts/feelings acknowledged  Taken 12/15/2021 2145 by Anne Aguilera RN  Trust Relationship/Rapport:   care explained   choices provided   emotional support provided   empathic listening provided   questions answered   questions encouraged   reassurance provided   thoughts/feelings acknowledged  Goal: Readiness for Transition of Care  Outcome: Ongoing, Progressing   Goal Outcome Evaluation:              Outcome Summary: Pt A&Ox4, VSS with the exception of BP as documented treated per MAR. Up ad ric. 24 hour urine collection initiated at 2244. Pt denies any complaints at this time.

## 2021-12-16 NOTE — SIGNIFICANT NOTE
4.6 second pause on telemetry. Strip printed and placed in paper chart. Reported to Dr. Holcomb [facility], in person on unit.

## 2021-12-16 NOTE — CASE MANAGEMENT/SOCIAL WORK
Discharge Planning Assessment  [facility]     Patient Name: Alexis Packer  MRN: [id number]  Today's Date: 12/16/2021    Admit Date: 12/14/2021     Discharge Needs Assessment     Row Name 12/16/21 1144       Living Environment    Lives With alone    Current Living Arrangements home/apartment/condo    Primary Care Provided by self    Provides Primary Care For no one    Family Caregiver if Needed parent(s)    Quality of Family Relationships supportive    Able to Return to Prior Arrangements yes       Resource/Environmental Concerns    Resource/Environmental Concerns none       Transition Planning    Patient/Family Anticipates Transition to home    Patient/Family Anticipated Services at Transition none    Transportation Anticipated car, drives self       Discharge Needs Assessment    Readmission Within the Last 30 Days no previous admission in last 30 days    Concerns to be Addressed denies needs/concerns at this time    Anticipated Changes Related to Illness none    Provided Post Acute Provider List? N/A    Provided Post Acute Provider Quality & Resource List? N/A               Discharge Plan     Row Name 12/16/21 1145       Plan    Plan Home    Plan Comments S/W pt at bedside.  Facesheet info confirmed.  Pt lives alone in a 2nd floor apartment w/ elevator access. He is IADLs and can drive.  He works full time.  No hx of HH or SNF.  Pt does not have a PCP.  [facility] provider list and appt liason # given.  He did enroll in Meds to Bed.  CCP will continue to follow and assist if needed.........sk              Continued Care and Services - Admitted Since 12/14/2021    Coordination has not been started for this encounter.          Demographic Summary     Row Name 12/16/21 1147       General Information    Admission Type observation    Arrived From home    Referral Source admission list    Reason for Consult discharge planning    Preferred Language English               Functional Status     Row Name 12/16/21 1140        Functional Status    Usual Activity Tolerance good    Current Activity Tolerance good       Functional Status, IADL    Medications independent    Meal Preparation independent    Housekeeping independent    Laundry independent    Shopping independent       Mental Status    General Appearance WDL WDL       Employment/    Employment Status employed full-time                         Nadia Mcclellan RN

## 2021-12-16 NOTE — PAYOR COMM NOTE
"Alexis Mejia (46 y.o. Male)     PLEASE SEE ATTACHED FOR INPT AUTH.    REF#[id number]    PLEASE CALL  OR  [phone] WITH INPT AUTH.    THANK YOU    TARYN WALKER LPN CCP            Date of Birth Social Security Number Address Home Phone MRN    1975  [id number] [address] [phone] [id number]    Sabianism Marital Status             None Single       Admission Date Admission Type Admitting Provider Attending Provider Department, Room/Bed    12/14/21 Emergency Jac Neal MD Benton, John B, MD [address], S[address]/1    Discharge Date Discharge Disposition Discharge Destination                         Attending Provider: Mian Holcomb MD    Allergies: No Known Allergies    Isolation: None   Infection: None   Code Status: CPR   Advance Care Planning Activity    Ht: 190.5 cm (75\")   Wt: 181 kg (400 lb)    Admission Cmt: None   Principal Problem: Anasarca [R60.1]                 Active Insurance as of 12/14/2021     Primary Coverage     Payor Plan Insurance Group Employer/Plan Group    [id number]     Payor Plan Address Payor Plan Phone Number Payor Plan Fax Number Effective Dates    [address] [address] [phone]  1/1/2020 - None Entered    [facility] [address]       Subscriber Name Subscriber Birth Date Member ID       ALEXIS MEJIA 1975 [id number]                 Emergency Contacts      (Rel.) Home Phone Work Phone Mobile Phone    MELISSA MEJIA (Mother) [phone] -- --               History & Physical      Elke Chavez APRN at 12/15/21 1044     Attestation signed by Mian Holcomb MD at 12/15/21 1410    Addendum: I have reviewed the history and plan as obtained by RADHA Cruz and have performed my own independent history. I have personally examined the patient and my exam confirms her physical findings. I agree with the plan as listed below, with the addition of the following:     Very pleasant " 47yo gentleman who presented to ER with c/o 2 weeks of worsening edema and TOWNSEND. Has h/o HTN but quit taking his meds a couple years ago and has not had any medical attention since.     Workup here concerning for renal etiology of his anasarca: proteinuria, hematuria, elevated Cr, hypoalbuminemia, anemia, hypertensive urgency.  EKG, Trop, CTA chest, A1c all reassuring.    Feeling better since receiving IV Lasix, making urine  No CP or palp, no HA or visual changes    A&O in NAD, very pleasant, not ill-appearing  HEENT: unremarkable  Neck: supple  Lungs: BS decreased due to body habitus, no rales or wheeze  CV: RRR, no murmur, pulses 2+ and symmetric, anasarca involving all 4 extremities and torso  Abd: obese, soft NTND, +BS  Extr: WWP  Skin: warm and dry  Neuro: no focal deficits    Renal to see  Continue IV Lasix, trend Cr  Check Echo  Strict I/Os and daily weights  Check iron studies and hemoccult  Start Coreg BID, PRN hydralazine in place, telemetry bed  Check venous duplex of BLEs given elevated DDimer--fortunately CTA neg for PE  Full code confirmed  SCDs for DVT ppx  Further orders to follow as suggested by evolving hospital course                           Patient Name:  Alexis Packer  YOB: 1975  MRN:  [id number]  Admit Date:  12/14/2021  Patient Care Team:  Provider, No Known as PCP - General      Subjective   History Present Illness     Chief Complaint   Patient presents with   • Leg Swelling   • Groin Swelling   • Shortness of Breath       Mr. Packer is a 46 y.o. with a history of severe obesity but no prior diagnosed medical conditions because he does not have routine medical care.  He presents with 2 weeks of severe bilateral lower extremity edema and anasarca including scrotal swelling.  The swelling in his legs is worse after day working on his feet.  It does improve a little bit in the morning but progresses again throughout the day.  He denies chest pain but has had shortness of breath  with exertion.  He denies a history of alcohol abuse but smokes cigarettes.  He denies headache, lightheadedness, palpitations, fever, chills, cough, congestion, LUTS.          History of Present Illness  Review of Systems   Constitutional: Negative for activity change, appetite change, chills, diaphoresis, fatigue, fever and unexpected weight change.   HENT: Positive for congestion. Negative for trouble swallowing.    Respiratory: Positive for shortness of breath. Negative for choking and chest tightness.    Cardiovascular: Negative for chest pain.   Gastrointestinal: Negative for abdominal distention, abdominal pain, blood in stool, constipation, diarrhea, nausea and vomiting.   Genitourinary: Positive for scrotal swelling. Negative for dysuria and flank pain.   Musculoskeletal: Negative for back pain.   Skin: Negative for pallor.   Neurological: Negative for dizziness, tremors, syncope, speech difficulty, weakness, light-headedness, numbness and headaches.   Hematological: Does not bruise/bleed easily.   Psychiatric/Behavioral: Negative for confusion.        Personal History     No past medical history on file.  Past Surgical History:   Procedure Laterality Date   • TOE SURGERY       Family History   Problem Relation Age of Onset   • Diabetes Father      Social History     Tobacco Use   • Smoking status: Current Every Day Smoker     Types: Cigarettes   • Smokeless tobacco: Never Used   Vaping Use   • Vaping Use: Never used   Substance Use Topics   • Alcohol use: Never   • Drug use: Defer     No current facility-administered medications on file prior to encounter.     No current outpatient medications on file prior to encounter.     No Known Allergies    Objective    Objective     Vital Signs  Temp:  [98.4 °F (36.9 °C)-99.3 °F (37.4 °C)] 98.4 °F (36.9 °C)  Heart Rate:  [] 90  Resp:  [14-20] 20  BP: (179-214)/(106-143) 195/114  SpO2:  [95 %-99 %] 95 %  on   ;   Device (Oxygen Therapy): room air  Body mass  index is 50 kg/m².    Physical Exam  Vitals and nursing note reviewed.   Constitutional:       General: He is not in acute distress.     Appearance: He is well-developed. He is obese. He is ill-appearing. He is not toxic-appearing.      Comments: Chronically ill-appearing   HENT:      Head: Normocephalic and atraumatic.      Mouth/Throat:      Mouth: Mucous membranes are moist.      Comments: Poor dentition  Eyes:      Extraocular Movements: Extraocular movements intact.      Conjunctiva/sclera: Conjunctivae normal.   Neck:      Trachea: No tracheal deviation.   Cardiovascular:      Rate and Rhythm: Regular rhythm. Tachycardia present.   Pulmonary:      Effort: Pulmonary effort is normal. No respiratory distress.      Breath sounds: Normal breath sounds.      Comments: Diminished breath sounds but exam limited by body habitus  Abdominal:      General: Bowel sounds are normal. There is no distension.      Palpations: Abdomen is soft. There is no mass.      Tenderness: There is no abdominal tenderness. There is no guarding or rebound.      Comments: Abdominal wall edema   Musculoskeletal:         General: Normal range of motion.      Cervical back: Normal range of motion.      Comments: Diffuse anasarca involving all 4 extremities.  Mild scrotal swelling   Skin:     General: Skin is warm and dry.      Capillary Refill: Capillary refill takes 2 to 3 seconds.   Neurological:      General: No focal deficit present.      Mental Status: He is alert and oriented to person, place, and time.   Psychiatric:         Mood and Affect: Mood normal.         Behavior: Behavior normal.         Results Review:  I reviewed the patient's new clinical results.  I reviewed the patient's new imaging results and agree with the interpretation.  I reviewed the patient's other test results and agree with the interpretation  I personally viewed and interpreted the patient's EKG/Telemetry data  Discussed with ED provider.    Lab Results (last 24  hours)     Procedure Component Value Units Date/Time    CBC & Differential [[id number]]  (Abnormal) Collected: 12/14/21 1655    Specimen: Blood Updated: 12/14/21 1934    Narrative:      The following orders were created for panel order CBC & Differential.  Procedure                               Abnormality         Status                     ---------                               -----------         ------                     CBC Auto Differential[[id number]]        Abnormal            Final result                 Please view results for these tests on the individual orders.    Comprehensive Metabolic Panel [[id number]]  (Abnormal) Collected: 12/14/21 1655    Specimen: Blood Updated: 12/14/21 2030     Glucose 89 mg/dL      BUN 11 mg/dL      Creatinine 1.66 mg/dL      Sodium 144 mmol/L      Potassium 3.7 mmol/L      Chloride 112 mmol/L      CO2 24.7 mmol/L      Calcium 7.9 mg/dL      Total Protein 4.9 g/dL      Albumin 1.70 g/dL      ALT (SGPT) 12 U/L      AST (SGOT) 15 U/L      Alkaline Phosphatase 85 U/L      Total Bilirubin 0.2 mg/dL      eGFR   Amer 54 mL/min/1.73      Globulin 3.2 gm/dL      A/G Ratio 0.5 g/dL      BUN/Creatinine Ratio 6.6     Anion Gap 7.3 mmol/L     Narrative:      GFR Normal >60  Chronic Kidney Disease <60  Kidney Failure <15      CBC Auto Differential [[id number]]  (Abnormal) Collected: 12/14/21 1655    Specimen: Blood Updated: 12/14/21 1934     WBC 7.70 10*3/mm3      RBC 3.80 10*6/mm3      Hemoglobin 11.5 g/dL      Hematocrit 34.9 %      MCV 91.8 fL      MCH 30.3 pg      MCHC 33.0 g/dL      RDW 12.6 %      RDW-SD 41.2 fl      MPV 10.7 fL      Platelets 284 10*3/mm3      Neutrophil % 63.1 %      Lymphocyte % 26.0 %      Monocyte % 8.1 %      Eosinophil % 2.2 %      Basophil % 0.5 %      Immature Grans % 0.1 %      Neutrophils, Absolute 4.86 10*3/mm3      Lymphocytes, Absolute 2.00 10*3/mm3      Monocytes, Absolute 0.62 10*3/mm3      Eosinophils, Absolute 0.17 10*3/mm3      Basophils,  Absolute 0.04 10*3/mm3      Immature Grans, Absolute 0.01 10*3/mm3      nRBC 0.0 /100 WBC     BNP [[id number]]  (Abnormal) Collected: 12/14/21 2200    Specimen: Blood Updated: 12/14/21 2301     proBNP 1,627.0 pg/mL     Narrative:      Among patients with dyspnea, NT-proBNP is highly sensitive for the detection of acute congestive heart failure. In addition NT-proBNP of <300 pg/ml effectively rules out acute congestive heart failure with 99% negative predictive value.    Results may be falsely decreased if patient taking Biotin.      Troponin [[id number]]  (Normal) Collected: 12/14/21 2200    Specimen: Blood Updated: 12/14/21 2301     Troponin T <0.010 ng/mL     Narrative:      Troponin T Reference Range:  <= 0.03 ng/mL-   Negative for AMI  >0.03 ng/mL-     Abnormal for myocardial necrosis.  Clinicians would have to utilize clinical acumen, EKG, Troponin and serial changes to determine if it is an Acute Myocardial Infarction or myocardial injury due to an underlying chronic condition.       Results may be falsely decreased if patient taking Biotin.      COVID PRE-OP / PRE-PROCEDURE SCREENING ORDER (NO ISOLATION) - Swab, Nasopharynx [[id number]]  (Normal) Collected: 12/14/21 2239    Specimen: Swab from Nasopharynx Updated: 12/14/21 2328    Narrative:      The following orders were created for panel order COVID PRE-OP / PRE-PROCEDURE SCREENING ORDER (NO ISOLATION) - Swab, Nasopharynx.  Procedure                               Abnormality         Status                     ---------                               -----------         ------                     COVID-19,[facility] IN-HOUSE...[[id number]]  Normal              Final result                 Please view results for these tests on the individual orders.    COVID-19,[facility] IN-HOUSE CEPHEID/BARRON NP SWAB IN TRANSPORT MEDIA 8-12 HR TAT - Swab, Nasopharynx [[id number]]  (Normal) Collected: 12/14/21 2239    Specimen: Swab from Nasopharynx Updated: 12/14/21 2328     COVID19 Not  Detected    Narrative:      Fact sheet for providers: https://www.fda.gov/media/037031/download    Fact sheet for patients: https://www.fda.gov/media/381869/download    Test performed by PCR.    Comprehensive Metabolic Panel [[id number]]  (Abnormal) Collected: 12/15/21 0534    Specimen: Blood Updated: 12/15/21 0622     Glucose 90 mg/dL      BUN 10 mg/dL      Creatinine 1.55 mg/dL      Sodium 145 mmol/L      Potassium 3.5 mmol/L      Chloride 112 mmol/L      CO2 26.3 mmol/L      Calcium 7.7 mg/dL      Total Protein 4.6 g/dL      Albumin 1.60 g/dL      ALT (SGPT) 11 U/L      AST (SGOT) 11 U/L      Alkaline Phosphatase 73 U/L      Total Bilirubin 0.3 mg/dL      eGFR  African Amer 58 mL/min/1.73      Globulin 3.0 gm/dL      A/G Ratio 0.5 g/dL      BUN/Creatinine Ratio 6.5     Anion Gap 6.7 mmol/L     Narrative:      GFR Normal >60  Chronic Kidney Disease <60  Kidney Failure <15      CBC (No Diff) [[id number]]  (Abnormal) Collected: 12/15/21 0534    Specimen: Blood Updated: 12/15/21 0600     WBC 6.98 10*3/mm3      RBC 3.61 10*6/mm3      Hemoglobin 10.8 g/dL      Hematocrit 32.4 %      MCV 89.8 fL      MCH 29.9 pg      MCHC 33.3 g/dL      RDW 12.3 %      RDW-SD 40.3 fl      MPV 10.7 fL      Platelets 279 10*3/mm3     Uric Acid [[id number]]  (Normal) Collected: 12/15/21 0534    Specimen: Blood Updated: 12/15/21 1037     Uric Acid 5.5 mg/dL     Hemoglobin A1c [[id number]]  (Normal) Collected: 12/15/21 0534    Specimen: Blood Updated: 12/15/21 1029     Hemoglobin A1C 5.20 %     Narrative:      Hemoglobin A1C Ranges:    Increased Risk for Diabetes  5.7% to 6.4%  Diabetes                     >= 6.5%  Diabetic Goal                < 7.0%          Imaging Results (Last 24 Hours)     Procedure Component Value Units Date/Time    CT Angiogram Chest [[id number]] Collected: 12/14/21 2348     Updated: 12/14/21 2356    Narrative:      CT ANGIOGRAM OF THE CHEST     HISTORY: Tachycardia     COMPARISON: None available.     TECHNIQUE: Axial CT  imaging was obtained through the thorax. IV contrast  was administered. Three-D reformatted images were obtained.     FINDINGS:  Exam is degraded by poor timing of the contrast bolus, as well as  respiratory artifact and the patient's body habitus. An obvious  pulmonary thromboembolus is not seen. The heart is enlarged. There is  interlobular septal thickening. There are small bilateral pleural  effusions, left greater than right. Constellation of findings is  suspicious for congestive heart failure. Thyroid gland, trachea,  esophagus appear unremarkable. Thoracic aorta measures within normal  size limits. There is no evidence of dissection. No acute abnormalities  are seen within the upper abdomen. No acute osseous abnormalities are  seen. There are some prominent mediastinal and hilar lymph nodes. For  example, a subcarinal node measures up to 1.9 cm. Right hilar node  measures up to 2.1 cm. Short-term CT follow-up in 3 months is suggested.  There does appear to be some body wall edema.       Impression:      Cardiomegaly, interlobular septal thickening, and small bilateral  pleural effusions, suspicious for congestive heart failure.     Radiation dose reduction techniques were utilized, including automated  exposure control and exposure modulation based on body size.     This report was finalized on 12/14/2021 11:53 PM by Dr. Eli Yeboah M.D.       XR Chest 1 View [[id number]] Collected: 12/14/21 2224     Updated: 12/14/21 2228    Narrative:      SINGLE VIEW OF THE CHEST     HISTORY: Severe     COMPARISON: None available.     FINDINGS:  Cardiomegaly is present. There may be some vascular congestion. No  pneumothorax or pleural effusion is seen. No acute infiltrates are  identified.       Impression:      Cardiomegaly with suspected vascular congestion.     This report was finalized on 12/14/2021 10:25 PM by Dr. Eli Yeboah M.D.                 ECG 12 Lead   Final Result   HEART RATE= 110  bpm   RR  Interval= 544  ms   RI Interval= 156  ms   P Horizontal Axis= 39  deg   P Front Axis= 56  deg   QRSD Interval= 85  ms   QT Interval= 351  ms   QRS Axis= 21  deg   T Wave Axis= 34  deg   - BORDERLINE ECG -   Sinus tachycardia   Borderline T wave abnormalities   Borderline prolonged QT interval   NO PRIOR TRACING AVAILABLE FOR COMPARISON   Electronically Signed By: Parker Sagastume ([facility]) 14-Dec-2021 18:29:57   Date and Time of Study: 2021-12-14 16:25:09           Assessment/Plan     Active Hospital Problems    Diagnosis  POA   • **Anasarca [R60.1]  Yes   • Hypoalbuminemia [E88.09]  Yes   • Elevated serum creatinine [R79.89]  Yes   • Tobacco abuse [Z72.0]  Yes   • Class 3 severe obesity in adult (HCC) [E66.01]  Yes   • Dyspnea [R06.00]  Yes   • Hypertensive urgency [I16.0]  Yes   • CARROL (acute kidney injury) ([facility]) [N17.9]  Yes   • Elevated brain natriuretic peptide (BNP) level [R79.89]  Yes      Resolved Hospital Problems   No resolved problems to display.       Mr. Packer is a 46 y.o. that presents with anasarca, hypoalbuminemia, elevated serum creatinine with no baseline labs or prior diagnostic work-up in the setting of severe obesity. EKG without acute ischemic change, borderline prolonged QTC.  No troponin elevation.  Elevated BNP, normal uric acid.  Creatinine 1.66 slightly improved to 1.55 after IV Lasix 40 mg.  He is stable on room air.  Consult nephrology for assistance with volume management and work-up. Check urine studies   Check echocardiogram, A1c, lipid panel, trend BMP   HTN- untreated. PRN hydralazine for now   Daily weights, I/O  High risk for FRANCISCO JAVIER given obesity.  Recommend outpatient PSG  Nutrition consult     · I discussed the patient's findings and my recommendations with patient, nursing staff, ED provider and Dr Holcomb.    VTE Prophylaxis - SCDs.  Code Status - Full code.       RADHA Lobato  [facility]  12/15/21  11:04 EST    Electronically signed by Mian Holcomb  MD TADEO at 12/15/21 1410          Emergency Department Notes      Nils Morrison, RN at 12/14/21 1607        Patient to er from [facility] urgent care with c/o swelling in bilateral legs and groin area that started over week ago and getting worse. Patient has mask on in triage along with staff.     Electronically signed by Nils Morrison RN at 12/14/21 1608     Ciera Blankenship RN at 12/14/21 2128        Patient was wearing a face mask throughout our encounter.  This RN wore appropriate PPE throughout the encounter.  Hand hygiene was performed before and after patient encounter.        Ciera Blankenship RN  12/14/21 2128      Electronically signed by Ciera Blankenship RN at 12/14/21 2128     oRck Felton MD at 12/14/21 2301          Brief history of present illness: 46-year-old male    Physical exam:   Presents to the emergency department with progressive bilateral lower extremity swelling over 1 week with some associated shortness of breath.  Patient denies any alcohol use or liver disease.  He reports no fevers.    MDM:    ED Triage Vitals   Temp Heart Rate Resp BP SpO2   12/14/21 1608 12/14/21 1608 12/14/21 1612 12/14/21 1610 12/14/21 1608   98.4 °F (36.9 °C) 117 20 (!) 209/143 99 %      Temp src Heart Rate Source Patient Position BP Location FiO2 (%)   12/14/21 1608 -- -- -- --   Infrared         No acute distress.  Large habitus noted in general.  No respiratory distress or tachypnea noted.  Pink warm and perfusing.  Patient has diffuse anasarca noted lower extremities, abdominal wall, and upper extremities.  Normal mood and affect.    Results for orders placed or performed during the hospital encounter of 12/14/21   Comprehensive Metabolic Panel    Specimen: Blood   Result Value Ref Range    Glucose 89 65 - 99 mg/dL    BUN 11 6 - 20 mg/dL    Creatinine 1.66 (H) 0.76 - 1.27 mg/dL    Sodium 144 136 - 145 mmol/L    Potassium 3.7 3.5 - 5.2 mmol/L    Chloride 112 (H) 98 - 107 mmol/L    CO2 24.7 22.0 - 29.0 mmol/L     Calcium 7.9 (L) 8.6 - 10.5 mg/dL    Total Protein 4.9 (L) 6.0 - 8.5 g/dL    Albumin 1.70 (L) 3.50 - 5.20 g/dL    ALT (SGPT) 12 1 - 41 U/L    AST (SGOT) 15 1 - 40 U/L    Alkaline Phosphatase 85 39 - 117 U/L    Total Bilirubin 0.2 0.0 - 1.2 mg/dL    eGFR  African Amer 54 (L) >60 mL/min/1.73    Globulin 3.2 gm/dL    A/G Ratio 0.5 g/dL    BUN/Creatinine Ratio 6.6 (L) 7.0 - 25.0    Anion Gap 7.3 5.0 - 15.0 mmol/L   CBC Auto Differential    Specimen: Blood   Result Value Ref Range    WBC 7.70 3.40 - 10.80 10*3/mm3    RBC 3.80 (L) 4.14 - 5.80 10*6/mm3    Hemoglobin 11.5 (L) 13.0 - 17.7 g/dL    Hematocrit 34.9 (L) 37.5 - 51.0 %    MCV 91.8 79.0 - 97.0 fL    MCH 30.3 26.6 - 33.0 pg    MCHC 33.0 31.5 - 35.7 g/dL    RDW 12.6 12.3 - 15.4 %    RDW-SD 41.2 37.0 - 54.0 fl    MPV 10.7 6.0 - 12.0 fL    Platelets 284 140 - 450 10*3/mm3    Neutrophil % 63.1 42.7 - 76.0 %    Lymphocyte % 26.0 19.6 - 45.3 %    Monocyte % 8.1 5.0 - 12.0 %    Eosinophil % 2.2 0.3 - 6.2 %    Basophil % 0.5 0.0 - 1.5 %    Immature Grans % 0.1 0.0 - 0.5 %    Neutrophils, Absolute 4.86 1.70 - 7.00 10*3/mm3    Lymphocytes, Absolute 2.00 0.70 - 3.10 10*3/mm3    Monocytes, Absolute 0.62 0.10 - 0.90 10*3/mm3    Eosinophils, Absolute 0.17 0.00 - 0.40 10*3/mm3    Basophils, Absolute 0.04 0.00 - 0.20 10*3/mm3    Immature Grans, Absolute 0.01 0.00 - 0.05 10*3/mm3    nRBC 0.0 0.0 - 0.2 /100 WBC   BNP    Specimen: Blood   Result Value Ref Range    proBNP 1,627.0 (H) 0.0 - 450.0 pg/mL   Troponin    Specimen: Blood   Result Value Ref Range    Troponin T <0.010 0.000 - 0.030 ng/mL   ECG 12 Lead   Result Value Ref Range    QT Interval 351 ms   Green Top (Gel)   Result Value Ref Range    Extra Tube Hold for add-ons.    Lavender Top   Result Value Ref Range    Extra Tube hold for add-on    Gold Top - SST   Result Value Ref Range    Extra Tube Hold for add-ons.    Light Blue Top   Result Value Ref Range    Extra Tube hold for add-on      XR Chest 1 View    Result Date:  12/14/2021  Narrative: SINGLE VIEW OF THE CHEST  HISTORY: Severe  COMPARISON: None available.  FINDINGS: Cardiomegaly is present. There may be some vascular congestion. No pneumothorax or pleural effusion is seen. No acute infiltrates are identified.      Impression: Cardiomegaly with suspected vascular congestion.  This report was finalized on 12/14/2021 10:25 PM by Dr. Eli Yeboah M.D.      I have recommended admission for further evaluation and treatment in this patient who has acute findings that could be potentially life-threatening.  Patient expresses understanding but may not be able to stay due to social commitments.  Discussed risk benefits and alternatives of leaving AGAINST MEDICAL ADVICE.  Patient will decide and let us know.    I have seen and personally evaluated this patient, discussed the case with the treating advanced practice provider, and reviewed their note. I was involved in the medical decision making during the evaluation, testing and disposition planning for this patient.     Rock Felton MD  12/14/21 2303      Electronically signed by Rock Felton MD at 12/14/21 2303     Gerry Melgar III, PA at 12/15/21 0021     Attestation signed by Rock Felton MD at 12/15/21 0201    MD Attestation Note    I supervised care provided by the midlevel provider.    The SILAS and I have discussed this patient's history, physical exam, and treatment plan. I have reviewed the documentation and personally had a face to face interaction with the patient  I affirm the documentation and agree with the treatment and plan.   My personal findings are documented in a separate note.         For this patient encounter, I reviewed the NP or PA documentation, treatment plan, and medical decision making. Rock Felton MD 12/15/2021 02:01 EST                   EMERGENCY DEPARTMENT ENCOUNTER    Room Number:  19/19  Date of encounter:  12/15/2021  PCP: Provider, No Known  Historian: Patient      HPI:  Chief  Complaint: Bilateral lower extremity swelling and shortness of breath  A complete HPI/ROS/PMH/PSH/SH/FH are unobtainable due to: Nothing    Context: Alexis Packer is a 46 y.o. male who presents to the ED c/o bilateral lower extremity swelling and some shortness of breath.  Patient states the symptoms began approximately 2 weeks ago and is gradually progressed.  He informs me he works long days on his feet and the swelling is more significant by the end of the day.  Over the past day or 2 the swelling now extends from his feet all the way to his abdomen.  He states there is moderate discomfort with ambulating.  Shortness of breath is not orthopneic in nature and is primarily worse with exertion.  He denies chest pain, fever, chills, recent sick contacts.  He is here for further evaluation.      PAST MEDICAL HISTORY  Active Ambulatory Problems     Diagnosis Date Noted   • No Active Ambulatory Problems     Resolved Ambulatory Problems     Diagnosis Date Noted   • No Resolved Ambulatory Problems     No Additional Past Medical History         PAST SURGICAL HISTORY  Past Surgical History:   Procedure Laterality Date   • TOE SURGERY           FAMILY HISTORY  Family History   Problem Relation Age of Onset   • Diabetes Father          SOCIAL HISTORY  Social History     Socioeconomic History   • Marital status: Single   Tobacco Use   • Smoking status: Current Every Day Smoker     Types: Cigarettes   • Smokeless tobacco: Never Used   Vaping Use   • Vaping Use: Never used   Substance and Sexual Activity   • Alcohol use: Never   • Drug use: Defer   • Sexual activity: Defer         ALLERGIES  Patient has no known allergies.        REVIEW OF SYSTEMS  Review of Systems   Constitutional: Negative for chills and fever.   HENT: Negative.    Eyes: Negative.    Respiratory: Positive for shortness of breath. Negative for cough and choking.    Cardiovascular: Positive for leg swelling. Negative for chest pain and palpitations.    Gastrointestinal: Negative for blood in stool, nausea and vomiting.        Abdominal wall swelling   Genitourinary: Negative.    Musculoskeletal: Negative.    Skin: Negative.    Neurological: Negative.    Psychiatric/Behavioral: Negative.         All systems reviewed and negative except for those discussed in HPI.       PHYSICAL EXAM    I have reviewed the triage vital signs and nursing notes.    ED Triage Vitals   Temp Heart Rate Resp BP SpO2   12/14/21 1608 12/14/21 1608 12/14/21 1612 12/14/21 1610 12/14/21 1608   98.4 °F (36.9 °C) 117 20 (!) 209/143 99 %      Temp src Heart Rate Source Patient Position BP Location FiO2 (%)   12/14/21 1608 -- -- -- --   Infrared           Physical Exam  GENERAL: Very pleasant, nontoxic, no acute distress   HENT: nares patent  EYES: no scleral icterus  CV: regular rhythm, regular rate, no rubs murmurs gallops.  Significant lower extremity pedal edema, mild upper extremity edema  RESPIRATORY: normal effort, no obvious rales wheezes or rhonchi  ABDOMEN: soft, nontender, abdominal wall edema  MUSCULOSKELETAL: no deformity  NEURO: alert, moves all extremities, follows commands  SKIN: warm, dry        LAB RESULTS  Recent Results (from the past 24 hour(s))   ECG 12 Lead    Collection Time: 12/14/21  4:25 PM   Result Value Ref Range    QT Interval 351 ms   Green Top (Gel)    Collection Time: 12/14/21  4:55 PM   Result Value Ref Range    Extra Tube Hold for add-ons.    Lavender Top    Collection Time: 12/14/21  4:55 PM   Result Value Ref Range    Extra Tube hold for add-on    Gold Top - SST    Collection Time: 12/14/21  4:55 PM   Result Value Ref Range    Extra Tube Hold for add-ons.    Light Blue Top    Collection Time: 12/14/21  4:55 PM   Result Value Ref Range    Extra Tube hold for add-on    Comprehensive Metabolic Panel    Collection Time: 12/14/21  4:55 PM    Specimen: Blood   Result Value Ref Range    Glucose 89 65 - 99 mg/dL    BUN 11 6 - 20 mg/dL    Creatinine 1.66 (H) 0.76 -  1.27 mg/dL    Sodium 144 136 - 145 mmol/L    Potassium 3.7 3.5 - 5.2 mmol/L    Chloride 112 (H) 98 - 107 mmol/L    CO2 24.7 22.0 - 29.0 mmol/L    Calcium 7.9 (L) 8.6 - 10.5 mg/dL    Total Protein 4.9 (L) 6.0 - 8.5 g/dL    Albumin 1.70 (L) 3.50 - 5.20 g/dL    ALT (SGPT) 12 1 - 41 U/L    AST (SGOT) 15 1 - 40 U/L    Alkaline Phosphatase 85 39 - 117 U/L    Total Bilirubin 0.2 0.0 - 1.2 mg/dL    eGFR  African Amer 54 (L) >60 mL/min/1.73    Globulin 3.2 gm/dL    A/G Ratio 0.5 g/dL    BUN/Creatinine Ratio 6.6 (L) 7.0 - 25.0    Anion Gap 7.3 5.0 - 15.0 mmol/L   CBC Auto Differential    Collection Time: 12/14/21  4:55 PM    Specimen: Blood   Result Value Ref Range    WBC 7.70 3.40 - 10.80 10*3/mm3    RBC 3.80 (L) 4.14 - 5.80 10*6/mm3    Hemoglobin 11.5 (L) 13.0 - 17.7 g/dL    Hematocrit 34.9 (L) 37.5 - 51.0 %    MCV 91.8 79.0 - 97.0 fL    MCH 30.3 26.6 - 33.0 pg    MCHC 33.0 31.5 - 35.7 g/dL    RDW 12.6 12.3 - 15.4 %    RDW-SD 41.2 37.0 - 54.0 fl    MPV 10.7 6.0 - 12.0 fL    Platelets 284 140 - 450 10*3/mm3    Neutrophil % 63.1 42.7 - 76.0 %    Lymphocyte % 26.0 19.6 - 45.3 %    Monocyte % 8.1 5.0 - 12.0 %    Eosinophil % 2.2 0.3 - 6.2 %    Basophil % 0.5 0.0 - 1.5 %    Immature Grans % 0.1 0.0 - 0.5 %    Neutrophils, Absolute 4.86 1.70 - 7.00 10*3/mm3    Lymphocytes, Absolute 2.00 0.70 - 3.10 10*3/mm3    Monocytes, Absolute 0.62 0.10 - 0.90 10*3/mm3    Eosinophils, Absolute 0.17 0.00 - 0.40 10*3/mm3    Basophils, Absolute 0.04 0.00 - 0.20 10*3/mm3    Immature Grans, Absolute 0.01 0.00 - 0.05 10*3/mm3    nRBC 0.0 0.0 - 0.2 /100 WBC   BNP    Collection Time: 12/14/21 10:00 PM    Specimen: Blood   Result Value Ref Range    proBNP 1,627.0 (H) 0.0 - 450.0 pg/mL   Troponin    Collection Time: 12/14/21 10:00 PM    Specimen: Blood   Result Value Ref Range    Troponin T <0.010 0.000 - 0.030 ng/mL   COVID-19,[facility] IN-HOUSE CEPHEID/BARRON NP SWAB IN TRANSPORT MEDIA 8-12 HR TAT - Swab, Nasopharynx    Collection Time: 12/14/21 10:39 PM     Specimen: Nasopharynx; Swab   Result Value Ref Range    COVID19 Not Detected Not Detected - Ref. Range       Ordered the above labs and independently reviewed the results.        RADIOLOGY  XR Chest 1 View    Result Date: 12/14/2021  SINGLE VIEW OF THE CHEST  HISTORY: Severe  COMPARISON: None available.  FINDINGS: Cardiomegaly is present. There may be some vascular congestion. No pneumothorax or pleural effusion is seen. No acute infiltrates are identified.      Cardiomegaly with suspected vascular congestion.  This report was finalized on 12/14/2021 10:25 PM by Dr. Eli Yeboah M.D.      CT Angiogram Chest    Result Date: 12/14/2021  CT ANGIOGRAM OF THE CHEST  HISTORY: Tachycardia  COMPARISON: None available.  TECHNIQUE: Axial CT imaging was obtained through the thorax. IV contrast was administered. Three-D reformatted images were obtained.  FINDINGS: Exam is degraded by poor timing of the contrast bolus, as well as respiratory artifact and the patient's body habitus. An obvious pulmonary thromboembolus is not seen. The heart is enlarged. There is interlobular septal thickening. There are small bilateral pleural effusions, left greater than right. Constellation of findings is suspicious for congestive heart failure. Thyroid gland, trachea, esophagus appear unremarkable. Thoracic aorta measures within normal size limits. There is no evidence of dissection. No acute abnormalities are seen within the upper abdomen. No acute osseous abnormalities are seen. There are some prominent mediastinal and hilar lymph nodes. For example, a subcarinal node measures up to 1.9 cm. Right hilar node measures up to 2.1 cm. Short-term CT follow-up in 3 months is suggested. There does appear to be some body wall edema.      Cardiomegaly, interlobular septal thickening, and small bilateral pleural effusions, suspicious for congestive heart failure.  Radiation dose reduction techniques were utilized, including automated exposure  control and exposure modulation based on body size.  This report was finalized on 12/14/2021 11:53 PM by Dr. Eli Yeboah M.D.        I ordered the above noted radiological studies. Reviewed by me and discussed with radiologist.  See dictation for official radiology interpretation.      PROCEDURES    Procedures      MEDICATIONS GIVEN IN ER    Medications   labetalol (NORMODYNE,TRANDATE) injection 20 mg (has no administration in time range)   sodium chloride 0.9 % flush 10 mL (has no administration in time range)   nitroglycerin (NITROSTAT) SL tablet 0.4 mg (has no administration in time range)   acetaminophen (TYLENOL) tablet 650 mg (has no administration in time range)   ondansetron (ZOFRAN) tablet 4 mg (has no administration in time range)     Or   ondansetron (ZOFRAN) injection 4 mg (has no administration in time range)   aluminum-magnesium hydroxide-simethicone (MAALOX MAX) 400-400-40 MG/5ML suspension 15 mL (has no administration in time range)   furosemide (LASIX) injection 40 mg (40 mg Intravenous Given 12/14/21 2251)   sodium chloride 0.9 % bolus 500 mL (500 mL Intravenous New Bag 12/14/21 2253)   labetalol (NORMODYNE,TRANDATE) injection 20 mg (20 mg Intravenous Given 12/14/21 2253)   iopamidol (ISOVUE-370) 76 % injection 100 mL (95 mL Intravenous Given 12/14/21 2318)         PROGRESS, DATA ANALYSIS, CONSULTS, AND MEDICAL DECISION MAKING    All labs have been independently reviewed by me.  All radiology studies have been reviewed by me and discussed with radiologist dictating the report.   EKG's independently viewed and interpreted by me.  Discussion below represents my analysis of pertinent findings related to patient's condition, differential diagnosis, treatment plan and final disposition.    DDx includes but is not limited to: Edema secondary to renal cause, edema secondary to hepatic cause, CHF, venous insufficiency.  Will obtain CBC, CMP, troponin, portable chest x-ray, BNP to further evaluate the  patient.  Given the tachycardia and the reported shortness of breath and the lower extremity edema, if the portable chest x-ray is unremarkable will obtain an angiogram of the chest to rule out blood clot.  Please see below for MDM course care.    ED Course as of 12/15/21 0028   Tue Dec 14, 2021   2300 Patient CBC has been reviewed and unremarkable.  CMP shows an albumin of 1.7.  Patient's creatinine is 1.66 and his GFR is 54.  His picture is more consistent with anasarca.  Suspect this is most likely renal.  However, at this stage cannot completely rule out cardiac.  I have given the patient IV Lasix.  I feel he would really benefit from mission to the hospital where he can be further evaluated by renal and cardiology as seen fit.  We will place a call to the hospitalist [RC]   Wed Dec 15, 2021   0006 Discussed patient's case with Yamel GARCIA with [facility].  To admit to Dr. Neal's care [RC]      ED Course User Index  [RC] Gerry Melgar III, PA           PPE: The patient wore a surgical mask throughout the entire patient encounter. I wore an N95.    AS OF 00:28 EST VITALS:    BP - (!) 200/106  HR - 96  TEMP - 98.4 °F (36.9 °C) (Infrared)  O2 SATS - 99%        DIAGNOSIS  Final diagnoses:   Hypoalbuminemia   Elevated serum creatinine   Elevated blood pressure reading   Anasarca         DISPOSITION  ADMISSION    Discussed treatment plan and reason for admission with pt/family and admitting physician.  Pt/family voiced understanding of the plan for admission for further testing/treatment as needed.              Gerry Melgar III, PA  12/15/21 0028      Electronically signed by Rock Felton MD at 12/15/21 0201     Andie Bennett, RN at 12/15/21 1614        Attempted to call report     Andie Bennett, RN  12/15/21 1625      Electronically signed by Andie Bennett, RN at 12/15/21 1625     Andie Bennett, RN at 12/15/21 1628        Renal US at bedside     Andie Bennett,  RN  12/15/21 1628      Electronically signed by Andie Bennett RN at 12/15/21 1628       Oxygen Therapy (since admission)     Date/Time SpO2 Device (Oxygen Therapy) Flow (L/min) Oxygen Concentration (%) ETCO2 (mmHg)    12/16/21 1413 99 room air -- -- --    12/16/21 1247 100 -- -- -- --    12/16/21 1119 100 room air -- -- --    12/16/21 0030 -- room air -- -- --    12/15/21 2237 98 room air -- -- --    12/15/21 2145 -- room air -- -- --    12/15/21 1917 98 room air -- -- --    12/15/21 1751 98 room air -- -- --    12/15/21 1610 99 -- -- -- --    12/15/21 1514 96 -- -- -- --    12/15/21 1336 97 -- -- -- --    12/15/21 1229 99 -- -- -- --    12/15/21 1129 99 -- -- -- --    12/15/21 0631 95 -- -- -- --    12/15/21 0611 95 -- -- -- --    12/15/21 0551 97 -- -- -- --    12/15/21 0531 97 -- -- -- --    12/15/21 0529 97 -- -- -- --    12/15/21 0105 97 -- -- -- --    12/14/21 2250 97 -- -- -- --    12/14/21 2201 98 -- -- -- --    12/14/21 1608 99 room air -- -- --        Intake & Output (last 3 days)       12/13 0701 12/14 0700 12/14 0701  12/15 0700 12/15 0701 12/16 0700 12/16 0701 12/17 0700    P.O.   2272     Total Intake(mL/kg)   2272 (12.6)     Urine (mL/kg/hr)   3150 (0.7) 420 (0.3)    Total Output   3150 420    Net   -878 -420                 Lines, Drains & Airways     Active LDAs     Name Placement date Placement time Site Days    Peripheral IV 12/14/21 2246 Right Antecubital 12/14/21 2246  Antecubital  1          Inactive LDAs     None                    Medication Administration Report for Alexis Packer as of 12/16/21 2398   Legend:    Given Hold Not Given Due Canceled Entry Other Actions    Time Time (Time) Time  Time-Action       Discontinued     Completed     Future     MAR Hold     Linked           Medications 12/14/21 12/15/21 12/16/21    acetaminophen (TYLENOL) tablet 650 mg  Dose: 650 mg  Freq: Every 4 Hours PRN Route: PO  PRN Reason: Mild Pain   Start: 12/15/21 0012   Admin Instructions:   Do not  exceed 4 grams of acetaminophen in a 24 hr period. Max dose of 2gm for AST/ALT greater than 120 units/L      If given for pain, use the following pain scale:   Mild Pain = Pain Score of 1-3, CPOT 1-2  Moderate Pain = Pain Score of 4-6, CPOT 3-4  Severe Pain = Pain Score of 7-10, CPOT 5-8          aluminum-magnesium hydroxide-simethicone (MAALOX MAX) 400-400-40 MG/5ML suspension 15 mL  Dose: 15 mL  Freq: Every 6 Hours PRN Route: PO  PRN Reason: Heartburn  Start: 12/15/21 0012   Admin Instructions:   Maximum 60 mL in 24 hours.          amLODIPine (NORVASC) tablet 5 mg  Dose: 5 mg  Freq: Every 24 Hours Scheduled Route: PO  Start: 12/16/21 0900   Admin Instructions:   Caution: Look alike/sound alike drug alert. Avoid grapefruit juice.         1119-Given             bumetanide (BUMEX) injection 2 mg  Dose: 2 mg  Freq: Every 8 Hours Route: IV  Start: 12/15/21 2030   Admin Instructions:   Give slow IV push over 1-2 minutes.        2147-Given             0430-Given     1250-Given     2130           carvedilol (COREG) tablet 6.25 mg  Dose: 6.25 mg  Freq: 2 Times Daily With Meals Route: PO  Start: 12/15/21 1800   Admin Instructions:   Give with food.        1830-Given             1119-Given     1800            cloNIDine (CATAPRES-TTS) 0.2 MG/24HR patch 1 patch  Dose: 1 patch  Freq: Weekly Route: TD  Start: 12/15/21 1800   Admin Instructions:   The peach-colored, rectangular patch with rounded corners contains the active medication.   The ivory-colored adhesive cover should then be applied directly over the patch to ensure the patch does not separate from the skin.   Apply patch to hairless area of intact skin on upper outer arm or chest; rotate patch location.   Caution: Look alike/sound alike drug alert.        2008-Medication Applied [C]              hydrALAZINE (APRESOLINE) injection 10 mg  Dose: 10 mg  Freq: Every 6 Hours PRN Route: IV  PRN Reason: High Blood Pressure  Start: 12/15/21 1130   Admin Instructions:   As  needed for SBP greater than 160  Caution: Look alike/sound alike drug alert        1257-Given     2155-Given            1250-Given             nitroglycerin (NITROSTAT) SL tablet 0.4 mg  Dose: 0.4 mg  Freq: Every 5 Minutes PRN Route: SL  PRN Reason: Chest Pain  PRN Comment: Only if SBP Greater Than 100  Start: 12/15/21 0012   Admin Instructions:   If Pain Unrelieved After 3 Doses Notify MD          ondansetron (ZOFRAN) tablet 4 mg  Dose: 4 mg  Freq: Every 6 Hours PRN Route: PO  PRN Reasons: Nausea,Vomiting  Start: 12/15/21 0012   Admin Instructions:   If BOTH ondansetron (ZOFRAN) and promethazine (PHENERGAN) are ordered use ondansetron first and THEN promethazine IF ondansetron is ineffective.         Or  ondansetron (ZOFRAN) injection 4 mg  Dose: 4 mg  Freq: Every 6 Hours PRN Route: IV  PRN Reasons: Nausea,Vomiting  Start: 12/15/21 0012   Admin Instructions:   If BOTH ondansetron (ZOFRAN) and promethazine (PHENERGAN) are ordered use ondansetron first and THEN promethazine IF ondansetron is ineffective.          sodium chloride 0.9 % flush 10 mL  Dose: 10 mL  Freq: As Needed Route: IV  PRN Reason: Line Care  Start: 12/15/21 0011         1120-Given            Completed Medications  Medications 12/14/21 12/15/21 12/16/21       furosemide (LASIX) injection 40 mg  Dose: 40 mg  Freq: Once Route: IV  Start: 12/14/21 2215   End: 12/14/21 2251       2251-Given               hydrALAZINE (APRESOLINE) injection 20 mg  Dose: 20 mg  Freq: Once Route: IV  Start: 12/15/21 1040   End: 12/15/21 1056   Admin Instructions:   Caution: Look alike/sound alike drug alert        1056-Given              iopamidol (ISOVUE-370) 76 % injection 100 mL  Dose: 100 mL  Freq: Once in Imaging Route: IV  Start: 12/14/21 2320   End: 12/14/21 2318       2318-Given               labetalol (NORMODYNE,TRANDATE) injection 20 mg  Dose: 20 mg  Freq: Once Route: IV  Start: 12/14/21 2354   End: 12/15/21 0106   Admin Instructions:   As ordered  Give by slow IV  Push each 20mg (or less) over 2 minutes        0106-Given              labetalol (NORMODYNE,TRANDATE) injection 20 mg  Dose: 20 mg  Freq: Once Route: IV  Start: 12/14/21 2235   End: 12/14/21 2253   Admin Instructions:   As ordered  Give by slow IV Push each 20mg (or less) over 2 minutes       2253-Given               perflutren (DEFINITY) 8.476 mg in Sodium Chloride (PF) 0.9 % 10 mL injection  Dose: 3 mL  Freq: Once in Imaging Route: IV  Start: 12/16/21 1130   End: 12/16/21 1032   Admin Instructions:   Mix 1.3 mL of mechanically activated Definity with 8.7 mL of normal saline. A total of 3 mL of the resulting Definity solution was administered.         1032-Given             potassium chloride (K-DUR,KLOR-CON) ER tablet 40 mEq  Dose: 40 mEq  Freq: Once Route: PO  Start: 12/15/21 1614   End: 12/15/21 1646   Admin Instructions:   Swallow whole; do not crush, split, or chew.        1646-Given              sodium chloride 0.9 % bolus 500 mL  Dose: 500 mL  Freq: Once Route: IV  Start: 12/14/21 2215   End: 12/15/21 0143       2253-New Bag             0143-Stopped             Discontinued Medications  Medications 12/14/21 12/15/21 12/16/21       bumetanide (BUMEX) injection 2 mg  Dose: 2 mg  Freq: Every 8 Hours - RT Route: IV  Start: 12/15/21 1614   End: 12/15/21 2030   Admin Instructions:   Give slow IV push over 1-2 minutes.        (2237)-Not Given              furosemide (LASIX) injection 40 mg  Dose: 40 mg  Freq: Every 12 Hours Route: IV  Start: 12/15/21 1039   End: 12/15/21 2030        1056-Given                          Physician Progress Notes (all)      Marylu Landry MD at 12/16/21 0736              NEPHROLOGY PROGRESS NOTE    PATIENT IDENTIFICATION:   Name:  Alexis Packer      MRN:  [id number]     46 y.o.  male             Reason for visit: CARROL    SUBJECTIVE:   Seen and examined.  No new issues overnight.  Denied any shortness of air or chest pain  OBJECTIVE:  Vitals:    12/15/21 2237 12/15/21 2246 12/15/21  2326 12/16/21 0430   BP:   144/92 (!) 166/102   BP Location:   Left arm Left arm   Patient Position:   Lying Lying   Pulse: 94   96   Resp: 18 18     Temp: 98 °F (36.7 °C)      TempSrc:       SpO2: 98%      Weight:       Height:               Body mass index is 50 kg/m².    Intake/Output Summary (Last 24 hours) at 12/16/2021 0736  Last data filed at 12/16/2021 0612  Gross per 24 hour   Intake 2272 ml   Output 3150 ml   Net -878 ml         Exam:  GEN:  No distress, appears stated age  EYES:   Anicteric sclera  ENT:    External ears/nose normal, MM are moist  NECK:  No adenopathy, JVP none  LUNGS: Normal chest on inspection; not labored  CV:  Normal S1S2, without murmur  ABD:  Non-tender, non-distended, no hepatosplenomegaly, +BS  EXT:  No edema; no cyanosis; clubbing    Scheduled meds:  amLODIPine, 5 mg, Oral, Q24H  bumetanide, 2 mg, Intravenous, Q8H  carvedilol, 6.25 mg, Oral, BID With Meals  cloNIDine, 1 patch, Transdermal, Weekly      IV meds:                           Data Review:    Results from last 7 days   Lab Units 12/15/21  0534 12/14/21  1655   SODIUM mmol/L 145 144   POTASSIUM mmol/L 3.5 3.7   CHLORIDE mmol/L 112* 112*   CO2 mmol/L 26.3 24.7   BUN mg/dL 10 11   CREATININE mg/dL 1.55* 1.66*   CALCIUM mg/dL 7.7* 7.9*   BILIRUBIN mg/dL 0.3 0.2   ALK PHOS U/L 73 85   ALT (SGPT) U/L 11 12   AST (SGOT) U/L 11 15   GLUCOSE mg/dL 90 89       Estimated Creatinine Clearance: 101.4 mL/min (A) (by C-G formula based on SCr of 1.55 mg/dL (H)).          Results from last 7 days   Lab Units 12/15/21  0534 12/14/21  1655   WBC 10*3/mm3 6.98 7.70   HEMOGLOBIN g/dL 10.8* 11.5*   PLATELETS 10*3/mm3 279 284                   ASSESSMENT:     Anasarca    Hypoalbuminemia    Elevated serum creatinine    Tobacco abuse    Class 3 severe obesity in adult (HCC)    Dyspnea    Hypertensive urgency    CARROL (acute kidney injury) (HCC)    Elevated brain natriuretic peptide (BNP) level    Proteinuria    Hematuria    Anemia    Elevated  d-dimer           - Proteinuria: Etiology is not very clear. He takes excessive NSAID;s. Will likely need a kidney biopsy. High D Dimer with proteinuria makes us worried about DVT/PE but CTA is negative.    - CARROL on CKD : Likely due to intravascular depletion and severe proteinuria. Will monitor kidney function closely  - High D Dimer: Had CTA  That was negative  - HTN:: Continue clonidine and Amlodipine and Coreg  - hypokalemia: Will check renin and aldosterone and renal doppler  - Hypocalcemia: Check PTH and Vit D    PLAN:      Awaiting morning labs.  Hepatitis profile negative.  Awaiting for the rest of GN work-up  Renal ultrasound is within normal limits  Patient will likely need biopsy inpatient or outpatient  Continue current blood pressure medications might need to further escalate  Surveillance labs    Marylu Landry MD  12/16/2021    07:36 EST       Electronically signed by Marylu Landry MD at 12/16/21 0738          Consult Notes (all)      Marylu Landry MD at 12/15/21 1545      Consult Orders    1. Inpatient Nephrology Consult [[id number]] ordered by Elke Chavez APRN at 12/15/21 0940                 Referring Provider: Elke Chavez APRN   Reason for Consultation: CARROL    Subjective     Chief complaint   Chief Complaint   Patient presents with   • Leg Swelling   • Groin Swelling   • Shortness of Breath       History of present illness:      45 Y/O AAM who was not seen by doctor before who came in due to worsening LE and TOWNSEND. Labs showed severe proteinuria. CR was 1.6 mg/dl on admission. He had CTA to rule out PE. Patient does not have family history of ESRD. He takes daily excessive NSAID's                 Past Medical History:   Diagnosis Date   • Hypertension      Past Surgical History:   Procedure Laterality Date   • TOE SURGERY       Family History   Problem Relation Age of Onset   • Diabetes Father      Social History     Tobacco Use   • Smoking status: Current Every Day  "Smoker     Types: Cigarettes   • Smokeless tobacco: Never Used   Vaping Use   • Vaping Use: Never used   Substance Use Topics   • Alcohol use: Never   • Drug use: Defer     (Not in a hospital admission)    Allergies:  Patient has no known allergies.    Review of Systems  Pertinent items are noted in HPI.    Objective     Vital Signs  Temp:  [98.4 °F (36.9 °C)] 98.4 °F (36.9 °C)  Heart Rate:  [] 92  Resp:  [20] 20  BP: (177-209)/(106-143) 186/121    Flowsheet Rows      First Filed Value   Admission Height 190.5 cm (75\") Documented at 12/14/2021 2201   Admission Weight 181 kg (400 lb) Documented at 12/14/2021 2201           I/O this shift:  In: 720 [P.O.:720]  Out: 1500 [Urine:1500]  No intake/output data recorded.    Intake/Output Summary (Last 24 hours) at 12/15/2021 1545  Last data filed at 12/15/2021 1528  Gross per 24 hour   Intake 720 ml   Output 1500 ml   Net -780 ml       Physical Exam:     General Appearance:    Alert, cooperative, in no acute distress   Head:    Normocephalic, without obvious abnormality, atraumatic   Eyes:            Lids and lashes normal, conjunctivae and sclerae normal, no   icterus, no pallor, corneas clear, PERRLA   Ears:    Ears appear intact with no abnormalities noted   Throat:   No oral lesions, no thrush, oral mucosa moist   Neck:   No adenopathy, supple, trachea midline, no thyromegaly, no   carotid bruit, no JVD   Back:     No kyphosis present, no scoliosis present, no skin lesions,      erythema or scars, no tenderness to percussion or                   palpation,   range of motion normal   Lungs:     Clear to auscultation,respirations regular, even and                  unlabored    Heart:    Regular rhythm and normal rate, normal S1 and S2, no            murmur, no gallop, no rub, no click   Chest Wall:    No abnormalities observed   Abdomen:     Normal bowel sounds, no masses, no organomegaly, soft        non-tender, non-distended, no guarding, no rebound                " tenderness   Rectal:     Deferred   Extremities:   Moves all extremities well, +++ edema, no cyanosis, no             redness   Pulses:   Pulses palpable and equal bilaterally   Skin:   No bleeding, bruising or rash   Lymph nodes:   No palpable adenopathy   Neurologic:   Cranial nerves 2 - 12 grossly intact, sensation intact, DTR       present and equal bilaterally       Results Review:  Results from last 7 days   Lab Units 12/15/21  0534 12/14/21  1655   SODIUM mmol/L 145 144   POTASSIUM mmol/L 3.5 3.7   CHLORIDE mmol/L 112* 112*   CO2 mmol/L 26.3 24.7   BUN mg/dL 10 11   CREATININE mg/dL 1.55* 1.66*   CALCIUM mg/dL 7.7* 7.9*   BILIRUBIN mg/dL 0.3 0.2   ALK PHOS U/L 73 85   ALT (SGPT) U/L 11 12   AST (SGOT) U/L 11 15   GLUCOSE mg/dL 90 89       Estimated Creatinine Clearance: 101.4 mL/min (A) (by C-G formula based on SCr of 1.55 mg/dL (H)).          Results from last 7 days   Lab Units 12/15/21  0534 12/14/21  1655   WBC 10*3/mm3 6.98 7.70   HEMOGLOBIN g/dL 10.8* 11.5*   PLATELETS 10*3/mm3 279 284             Active Medications  carvedilol, 6.25 mg, Oral, BID With Meals  furosemide, 40 mg, Intravenous, Q12H           Assessment/Plan   Assessment      Anasarca    Hypoalbuminemia    Elevated serum creatinine    Tobacco abuse    Class 3 severe obesity in adult (HCC)    Dyspnea    Hypertensive urgency    CARROL (acute kidney injury) ([facility])    Elevated brain natriuretic peptide (BNP) level    Proteinuria    Hematuria    Anemia    Elevated d-dimer    - Proteinuria: Etiology is not very clear. He takes excessive NSAID;s. Will likely need a kidney biopsy. Will send GN work-up. Stat aggressive diuresis. High D Dimer with proteinuria makes us worried about DVT/PE but CTA is negative. Avoid NSAID's. Will need to monitor kidney function after CTA.     - CARROL on CKD : Likely due to intravascular depletion and severe proteinuria. Will monitor kidney function closely  - High D Dimer: Had CTA  That was negative  - HTN:: Start Clonidine  and Amlodipine  - hypokalemia: Will check renin and aldosterone and renal doppler  - Hypocalcemia: Check PTH and Vit D        Marylu Landry MD  12/15/21  15:45 EST                Electronically signed by Marylu Landry MD at 12/15/21 8963

## 2021-12-16 NOTE — PROGRESS NOTES
Name: Alexis Packer ADMIT: 2021   : 1975  PCP: Provider, No Known    MRN: [id number] LOS: 0 days   AGE/SEX: 46 y.o. male  ROOM: [facility]     Subjective   Subjective   Feeling better today. Making urine and voiding well. Tolerating po. No SOA or CP or palp. No HA or vis changes.    Review of Systems     Objective   Objective   Vital Signs  Temp:  [98 °F (36.7 °C)-98.5 °F (36.9 °C)] 98.5 °F (36.9 °C)  Heart Rate:  [71-99] 90  Resp:  [18-20] 18  BP: (144-184)/() 153/98  SpO2:  [98 %-100 %] 99 %  on   ;   Device (Oxygen Therapy): room air  Body mass index is 50 kg/m².  Physical Exam  Vitals and nursing note reviewed.   Constitutional:       General: He is not in acute distress.     Appearance: He is obese. He is not ill-appearing, toxic-appearing or diaphoretic.   HENT:      Head: Normocephalic and atraumatic.      Right Ear: External ear normal.      Left Ear: External ear normal.      Nose: Nose normal.      Mouth/Throat:      Mouth: Mucous membranes are moist.      Pharynx: Oropharynx is clear.   Eyes:      General: No scleral icterus.        Right eye: No discharge.         Left eye: No discharge.      Extraocular Movements: Extraocular movements intact.      Conjunctiva/sclera: Conjunctivae normal.   Cardiovascular:      Rate and Rhythm: Normal rate and regular rhythm.      Pulses: Normal pulses.      Heart sounds: Normal heart sounds.   Pulmonary:      Effort: Pulmonary effort is normal. No respiratory distress.      Breath sounds: Normal breath sounds. No wheezing or rales.   Abdominal:      General: Bowel sounds are normal. There is no distension.      Palpations: Abdomen is soft.      Tenderness: There is no abdominal tenderness.   Musculoskeletal:         General: Swelling (3+ edema of BLEs and torso) present. Normal range of motion.      Cervical back: Neck supple. No rigidity.   Lymphadenopathy:      Cervical: No cervical adenopathy.   Skin:     General: Skin is warm and dry.       Capillary Refill: Capillary refill takes less than 2 seconds.      Coloration: Skin is not jaundiced.      Findings: No rash.   Neurological:      General: No focal deficit present.      Mental Status: He is alert and oriented to person, place, and time. Mental status is at baseline.      Cranial Nerves: No cranial nerve deficit.      Coordination: Coordination normal.   Psychiatric:         Mood and Affect: Mood normal.         Behavior: Behavior normal.         Thought Content: Thought content normal.         Results Review     I reviewed the patient's new clinical results.  Results from last 7 days   Lab Units 12/16/21  0651 12/15/21  0534 12/14/21  1655   WBC 10*3/mm3 8.17 6.98 7.70   HEMOGLOBIN g/dL 11.4* 10.8* 11.5*   PLATELETS 10*3/mm3 290 279 284     Results from last 7 days   Lab Units 12/16/21  0652 12/16/21  0651 12/15/21  0534 12/14/21  1655   SODIUM mmol/L 140 140 145 144   POTASSIUM mmol/L 3.6 3.3* 3.5 3.7   CHLORIDE mmol/L 108* 107 112* 112*   CO2 mmol/L 26.0 26.6 26.3 24.7   BUN mg/dL 12 13 10 11   CREATININE mg/dL 1.66* 1.38* 1.55* 1.66*   GLUCOSE mg/dL 94 97 90 89   EGFR IF AFRICN AM mL/min/1.73 54* 67 58* 54*     Results from last 7 days   Lab Units 12/16/21  0652 12/16/21  0651 12/15/21  0534 12/14/21  1655   ALBUMIN g/dL 2.00* 1.60* 1.60* 1.70*   BILIRUBIN mg/dL 0.2 0.2 0.3 0.2   ALK PHOS U/L 80 74 73 85   AST (SGOT) U/L 10 13 11 15   ALT (SGPT) U/L 11 10 11 12     Results from last 7 days   Lab Units 12/16/21  0652 12/16/21  0651 12/15/21  0534 12/14/21  1655   CALCIUM mg/dL 7.7* 7.9* 7.7* 7.9*   ALBUMIN g/dL 2.00* 1.60* 1.60* 1.70*   MAGNESIUM mg/dL  --  1.9  --   --        COVID19   Date Value Ref Range Status   12/14/2021 Not Detected Not Detected - Ref. Range Final     Hemoglobin A1C   Date/Time Value Ref Range Status   12/15/2021 0534 5.20 4.80 - 5.60 % Final       Duplex Renal Artery - Bilateral Complete CAR  · Normal right renal artery.  · Normal left renal artery.     Duplex Venous  Lower Extremity - Bilateral CAR  · Normal bilateral lower extremity venous duplex scan.     Adult Transthoracic Echo Complete W/ Cont if Necessary Per Protocol  · Estimated left ventricular EF = 56% Estimated left ventricular EF was in   agreement with the calculated left ventricular EF. Left ventricular   systolic function is normal.  · Left ventricular wall thickness is consistent with moderate to severe   concentric hypertrophy.  · Mild lateral wall hypokinesis  · Left ventricular diastolic function is consistent with (grade I)   impaired relaxation.  · The right ventricular cavity is mildly dilated.  · Estimated right ventricular systolic pressure from tricuspid   regurgitation is normal (<35 mmHg).  · No aortic valve regurgitation or stenosis is present.  · Mild mitral valve regurgitation is present.  · Mild tricuspid valve regurgitation is present.       Scheduled Medications  amLODIPine, 5 mg, Oral, Q24H  bumetanide, 2 mg, Intravenous, Q8H  carvedilol, 6.25 mg, Oral, BID With Meals  cloNIDine, 1 patch, Transdermal, Weekly    Infusions   Diet  Diet Regular; Cardiac       Assessment/Plan     Active Hospital Problems    Diagnosis  POA   • **Anasarca [R60.1]  Yes   • Vitamin D deficiency [E55.9]  Yes   • Hypoalbuminemia [E88.09]  Yes   • Elevated serum creatinine [R79.89]  Yes   • Tobacco abuse [Z72.0]  Yes   • Class 3 severe obesity in adult (HCC) [E66.01]  Yes   • Dyspnea [R06.00]  Yes   • Hypertensive urgency [I16.0]  Yes   • CARROL (acute kidney injury) (HCC) [N17.9]  Yes   • Elevated brain natriuretic peptide (BNP) level [R79.89]  Yes   • Proteinuria [R80.9]  Yes   • Hematuria [R31.9]  Yes   • Anemia [D64.9]  Yes   • Elevated d-dimer [R79.89]  Yes      Resolved Hospital Problems   No resolved problems to display.       46 y.o. male admitted with Anasarca.    Workup here concerning for renal etiology of his anasarca: proteinuria, hematuria, elevated Cr, hypoalbuminemia, anemia, hypertensive urgency.  EKG, Trop,  CTA chest, A1c all reassuring.     Appreciate Renal attention to pt  Continue IV Bumex, Cr really unchanged  Renal U/S okay, renal artery duplex fine  Will need renal biopsy at some point  Strict I/Os and daily weights    Iron studies inconclusive, hemoccult negative, Hgb stable    Started Coreg, hydralazine, and clonidine with some minor improvement, continue to monitor and adjust as needed  Echo consistent with longstanding untreated HTN, has some lateral wall hypokinesis and also had a 4.6 second pause (asymptomatic) this afternoon, will ask Card to see, lytes are fine after replacing K+ orally    Vitamin D quite low, start oral replacement    DDimer elevated, fortunately CTA neg for PE and venous duplex BLEs neg for DVT      · SCDs for DVT prophylaxis.  · Full code.  · Discussed with patient, nursing staff, CCP and care team on multidisciplinary rounds.  · Anticipate discharge home with family, timing yet to be determined.      Mian Holcomb MD  [facility]  12/16/21  16:10 EST

## 2021-12-16 NOTE — PROGRESS NOTES
NEPHROLOGY PROGRESS NOTE    PATIENT IDENTIFICATION:   Name:  Alexis Packer      MRN:  [id number]     46 y.o.  male             Reason for visit: CARROL    SUBJECTIVE:   Seen and examined.  No new issues overnight.  Denied any shortness of air or chest pain  OBJECTIVE:  Vitals:    12/15/21 2237 12/15/21 2246 12/15/21 2326 12/16/21 0430   BP:   144/92 (!) 166/102   BP Location:   Left arm Left arm   Patient Position:   Lying Lying   Pulse: 94   96   Resp: 18 18     Temp: 98 °F (36.7 °C)      TempSrc:       SpO2: 98%      Weight:       Height:               Body mass index is 50 kg/m².    Intake/Output Summary (Last 24 hours) at 12/16/2021 0736  Last data filed at 12/16/2021 0612  Gross per 24 hour   Intake 2272 ml   Output 3150 ml   Net -878 ml         Exam:  GEN:  No distress, appears stated age  EYES:   Anicteric sclera  ENT:    External ears/nose normal, MM are moist  NECK:  No adenopathy, JVP none  LUNGS: Normal chest on inspection; not labored  CV:  Normal S1S2, without murmur  ABD:  Non-tender, non-distended, no hepatosplenomegaly, +BS  EXT:  No edema; no cyanosis; clubbing    Scheduled meds:  amLODIPine, 5 mg, Oral, Q24H  bumetanide, 2 mg, Intravenous, Q8H  carvedilol, 6.25 mg, Oral, BID With Meals  cloNIDine, 1 patch, Transdermal, Weekly      IV meds:                           Data Review:    Results from last 7 days   Lab Units 12/15/21  0534 12/14/21  1655   SODIUM mmol/L 145 144   POTASSIUM mmol/L 3.5 3.7   CHLORIDE mmol/L 112* 112*   CO2 mmol/L 26.3 24.7   BUN mg/dL 10 11   CREATININE mg/dL 1.55* 1.66*   CALCIUM mg/dL 7.7* 7.9*   BILIRUBIN mg/dL 0.3 0.2   ALK PHOS U/L 73 85   ALT (SGPT) U/L 11 12   AST (SGOT) U/L 11 15   GLUCOSE mg/dL 90 89       Estimated Creatinine Clearance: 101.4 mL/min (A) (by C-G formula based on SCr of 1.55 mg/dL (H)).          Results from last 7 days   Lab Units 12/15/21  0534 12/14/21  1655   WBC 10*3/mm3 6.98 7.70   HEMOGLOBIN g/dL 10.8* 11.5*   PLATELETS 10*3/mm3 279 284                    ASSESSMENT:     Anasarca    Hypoalbuminemia    Elevated serum creatinine    Tobacco abuse    Class 3 severe obesity in adult (HCC)    Dyspnea    Hypertensive urgency    CARROL (acute kidney injury) (HCC)    Elevated brain natriuretic peptide (BNP) level    Proteinuria    Hematuria    Anemia    Elevated d-dimer           - Proteinuria: Etiology is not very clear. He takes excessive NSAID;s. Will likely need a kidney biopsy. High D Dimer with proteinuria makes us worried about DVT/PE but CTA is negative.    - CARROL on CKD : Likely due to intravascular depletion and severe proteinuria. Will monitor kidney function closely  - High D Dimer: Had CTA  That was negative  - HTN:: Continue clonidine and Amlodipine and Coreg  - hypokalemia: Will check renin and aldosterone and renal doppler  - Hypocalcemia: Check PTH and Vit D    PLAN:      Awaiting morning labs.  Hepatitis profile negative.  Awaiting for the rest of GN work-up  Renal ultrasound is within normal limits  Patient will likely need biopsy inpatient or outpatient  Continue current blood pressure medications might need to further escalate  Surveillance labs    Marylu Landry MD  12/16/2021    07:36 EST

## 2021-12-17 LAB
ALBUMIN SERPL-MCNC: 1.8 G/DL (ref 3.5–5.2)
ALBUMIN/GLOB SERPL: 0.6 G/DL
ALP SERPL-CCNC: 71 U/L (ref 39–117)
ALT SERPL W P-5'-P-CCNC: 11 U/L (ref 1–41)
ANA SER QL: NEGATIVE
ANA SER QL: NEGATIVE
ANION GAP SERPL CALCULATED.3IONS-SCNC: 5.4 MMOL/L (ref 5–15)
ANION GAP SERPL CALCULATED.3IONS-SCNC: 8.5 MMOL/L (ref 5–15)
AST SERPL-CCNC: 15 U/L (ref 1–40)
BILIRUB SERPL-MCNC: <0.2 MG/DL (ref 0–1.2)
BUN SERPL-MCNC: 10 MG/DL (ref 6–20)
BUN SERPL-MCNC: 11 MG/DL (ref 6–20)
BUN/CREAT SERPL: 6.6 (ref 7–25)
BUN/CREAT SERPL: 7.4 (ref 7–25)
C3 SERPL-MCNC: 171 MG/DL (ref 82–167)
C4 SERPL-MCNC: 33 MG/DL (ref 12–38)
CALCIUM SPEC-SCNC: 7.7 MG/DL (ref 8.6–10.5)
CALCIUM SPEC-SCNC: 7.8 MG/DL (ref 8.6–10.5)
CHLORIDE SERPL-SCNC: 107 MMOL/L (ref 98–107)
CHLORIDE SERPL-SCNC: 110 MMOL/L (ref 98–107)
CO2 SERPL-SCNC: 25.5 MMOL/L (ref 22–29)
CO2 SERPL-SCNC: 27.6 MMOL/L (ref 22–29)
CREAT SERPL-MCNC: 1.49 MG/DL (ref 0.76–1.27)
CREAT SERPL-MCNC: 1.51 MG/DL (ref 0.76–1.27)
DEPRECATED RDW RBC AUTO: 44.5 FL (ref 37–54)
ERYTHROCYTE [DISTWIDTH] IN BLOOD BY AUTOMATED COUNT: 13 % (ref 12.3–15.4)
GFR SERPL CREATININE-BSD FRML MDRD: 60 ML/MIN/1.73
GFR SERPL CREATININE-BSD FRML MDRD: 61 ML/MIN/1.73
GLOBULIN UR ELPH-MCNC: 2.8 GM/DL
GLUCOSE SERPL-MCNC: 86 MG/DL (ref 65–99)
GLUCOSE SERPL-MCNC: 93 MG/DL (ref 65–99)
HCT VFR BLD AUTO: 35.4 % (ref 37.5–51)
HGB BLD-MCNC: 11.3 G/DL (ref 13–17.7)
KAPPA LC FREE SER-MCNC: 62.5 MG/L (ref 3.3–19.4)
KAPPA LC FREE/LAMBDA FREE SER: 1.38 {RATIO} (ref 0.26–1.65)
LAMBDA LC FREE SERPL-MCNC: 45.2 MG/L (ref 5.7–26.3)
MAGNESIUM SERPL-MCNC: 1.9 MG/DL (ref 1.6–2.6)
MCH RBC QN AUTO: 29.7 PG (ref 26.6–33)
MCHC RBC AUTO-ENTMCNC: 31.9 G/DL (ref 31.5–35.7)
MCV RBC AUTO: 93.2 FL (ref 79–97)
PLATELET # BLD AUTO: 287 10*3/MM3 (ref 140–450)
PMV BLD AUTO: 10.8 FL (ref 6–12)
POTASSIUM SERPL-SCNC: 3.4 MMOL/L (ref 3.5–5.2)
POTASSIUM SERPL-SCNC: 3.4 MMOL/L (ref 3.5–5.2)
PROT SERPL-MCNC: 4.6 G/DL (ref 6–8.5)
RBC # BLD AUTO: 3.8 10*6/MM3 (ref 4.14–5.8)
RHEUMATOID FACT SERPL-ACNC: <10 IU/ML
SODIUM SERPL-SCNC: 141 MMOL/L (ref 136–145)
SODIUM SERPL-SCNC: 143 MMOL/L (ref 136–145)
WBC NRBC COR # BLD: 6.41 10*3/MM3 (ref 3.4–10.8)

## 2021-12-17 PROCEDURE — 94762 N-INVAS EAR/PLS OXIMTRY CONT: CPT

## 2021-12-17 PROCEDURE — 25010000002 HYDRALAZINE PER 20 MG: Performed by: NURSE PRACTITIONER

## 2021-12-17 PROCEDURE — 99222 1ST HOSP IP/OBS MODERATE 55: CPT | Performed by: NURSE PRACTITIONER

## 2021-12-17 PROCEDURE — 85027 COMPLETE CBC AUTOMATED: CPT | Performed by: HOSPITALIST

## 2021-12-17 PROCEDURE — 80053 COMPREHEN METABOLIC PANEL: CPT | Performed by: INTERNAL MEDICINE

## 2021-12-17 PROCEDURE — 94799 UNLISTED PULMONARY SVC/PX: CPT

## 2021-12-17 PROCEDURE — 80048 BASIC METABOLIC PNL TOTAL CA: CPT | Performed by: NURSE PRACTITIONER

## 2021-12-17 PROCEDURE — 83735 ASSAY OF MAGNESIUM: CPT | Performed by: HOSPITALIST

## 2021-12-17 PROCEDURE — 93010 ELECTROCARDIOGRAM REPORT: CPT | Performed by: INTERNAL MEDICINE

## 2021-12-17 PROCEDURE — 93005 ELECTROCARDIOGRAM TRACING: CPT | Performed by: INTERNAL MEDICINE

## 2021-12-17 RX ORDER — AMLODIPINE BESYLATE 5 MG/1
5 TABLET ORAL ONCE
Status: COMPLETED | OUTPATIENT
Start: 2021-12-17 | End: 2021-12-17

## 2021-12-17 RX ORDER — FERROUS SULFATE 325(65) MG
325 TABLET ORAL 2 TIMES DAILY WITH MEALS
Status: DISCONTINUED | OUTPATIENT
Start: 2021-12-17 | End: 2021-12-20 | Stop reason: HOSPADM

## 2021-12-17 RX ORDER — CLONIDINE 0.3 MG/24H
1 PATCH, EXTENDED RELEASE TRANSDERMAL WEEKLY
Status: DISCONTINUED | OUTPATIENT
Start: 2021-12-17 | End: 2021-12-20 | Stop reason: HOSPADM

## 2021-12-17 RX ORDER — ERGOCALCIFEROL 1.25 MG/1
50000 CAPSULE ORAL
Status: DISCONTINUED | OUTPATIENT
Start: 2021-12-17 | End: 2021-12-20 | Stop reason: HOSPADM

## 2021-12-17 RX ORDER — PRAVASTATIN SODIUM 20 MG
20 TABLET ORAL NIGHTLY
Status: DISCONTINUED | OUTPATIENT
Start: 2021-12-17 | End: 2021-12-20 | Stop reason: HOSPADM

## 2021-12-17 RX ORDER — AMLODIPINE BESYLATE 10 MG/1
10 TABLET ORAL
Status: DISCONTINUED | OUTPATIENT
Start: 2021-12-18 | End: 2021-12-20 | Stop reason: HOSPADM

## 2021-12-17 RX ADMIN — BUMETANIDE 2 MG: 0.25 INJECTION INTRAMUSCULAR; INTRAVENOUS at 13:27

## 2021-12-17 RX ADMIN — BUMETANIDE 2 MG: 0.25 INJECTION INTRAMUSCULAR; INTRAVENOUS at 23:06

## 2021-12-17 RX ADMIN — HYDRALAZINE HYDROCHLORIDE 10 MG: 20 INJECTION INTRAMUSCULAR; INTRAVENOUS at 00:27

## 2021-12-17 RX ADMIN — BUMETANIDE 2 MG: 0.25 INJECTION INTRAMUSCULAR; INTRAVENOUS at 06:04

## 2021-12-17 RX ADMIN — FERROUS SULFATE TAB 325 MG (65 MG ELEMENTAL FE) 325 MG: 325 (65 FE) TAB at 18:21

## 2021-12-17 RX ADMIN — AMLODIPINE BESYLATE 5 MG: 5 TABLET ORAL at 08:31

## 2021-12-17 RX ADMIN — CARVEDILOL 6.25 MG: 6.25 TABLET, FILM COATED ORAL at 08:31

## 2021-12-17 RX ADMIN — FERROUS SULFATE TAB 325 MG (65 MG ELEMENTAL FE) 325 MG: 325 (65 FE) TAB at 11:35

## 2021-12-17 RX ADMIN — CLONIDINE 1 PATCH: 0.3 PATCH TRANSDERMAL at 11:35

## 2021-12-17 RX ADMIN — AMLODIPINE BESYLATE 5 MG: 5 TABLET ORAL at 11:35

## 2021-12-17 RX ADMIN — SODIUM CHLORIDE, PRESERVATIVE FREE 10 ML: 5 INJECTION INTRAVENOUS at 08:31

## 2021-12-17 RX ADMIN — HYDRALAZINE HYDROCHLORIDE 10 MG: 20 INJECTION INTRAMUSCULAR; INTRAVENOUS at 06:46

## 2021-12-17 RX ADMIN — PRAVASTATIN SODIUM 20 MG: 20 TABLET ORAL at 23:06

## 2021-12-17 NOTE — PAYOR COMM NOTE
"Alexis Packer (46 y.o. Male)     PLEASE SEE ATTACHED CLINICAL  REVIEW.   THIS IS ADDITIONAL CLNS FOR 21      REF#[id number]    PLEASE CALL   OR  [phone]    THANK YOU    TARYN WALKER LPN CCP              Date of Birth Social Security Number Address Home Phone MRN    1975  [id number] [address] [phone] [id number]    Orthodoxy Marital Status             None Single       Admission Date Admission Type Admitting Provider Attending Provider Department, Room/Bed    21 Emergency Jac Neal MD Nguyen, Minh Loc, MD [address], [address]1    Discharge Date Discharge Disposition Discharge Destination                         Attending Provider: Jac Neal MD    Allergies: No Known Allergies    Isolation: None   Infection: None   Code Status: CPR   Advance Care Planning Activity    Ht: 190.5 cm (75\")   Wt: 173 kg (381 lb 9.6 oz)    Admission Cmt: None   Principal Problem: Anasarca [R60.1]                 Active Insurance as of 2021     Primary Coverage     Payor Plan Insurance Group Employer/Plan Group    [id number]     Payor Plan Address Payor Plan Phone Number Payor Plan Fax Number Effective Dates    [address] [address] [phone]  2020 - None Entered    [facility] [address]       Subscriber Name Subscriber Birth Date Member ID       ALEXIS PACKER 1975 [id number]                 Emergency Contacts      (Rel.) Home Phone Work Phone Mobile Phone    MELISSA PACKER (Mother) [phone] -- --              Operative/Procedure Notes (last 48 hours)  Notes from 12/15/21 1530 through 21 1530   No notes of this type exist for this encounter.          Physician Progress Notes (last 48 hours)      Jac Neal MD at 21 5971              Name: Alexis aPcker ADMIT: 2021   : 1975  PCP: Provider, No Known    MRN: [id number] LOS: 1 days   AGE/SEX: 46 y.o. male  ROOM: " S517/1     Subjective   Subjective    no complaints. wanting to go home. states swelling improved    Review of Systems   Constitutional: Negative.  Negative for chills and fever.   HENT: Negative.    Eyes: Negative.    Respiratory: Negative.  Negative for cough, chest tightness and shortness of breath.    Cardiovascular: Positive for leg swelling. Negative for chest pain.   Gastrointestinal: Negative.  Negative for abdominal pain, diarrhea, nausea and vomiting.   Endocrine: Negative.    Genitourinary: Negative.    Musculoskeletal: Negative.    Skin: Negative.    Allergic/Immunologic: Negative.    Neurological: Negative.    Hematological: Negative.    Psychiatric/Behavioral: Negative.      Objective   Objective   Vital Signs  Temp:  [98.3 °F (36.8 °C)-98.8 °F (37.1 °C)] 98.6 °F (37 °C)  Heart Rate:  [82-96] 90  Resp:  [18] 18  BP: (141-178)/() 165/94  SpO2:  [99 %-100 %] 99 %  on   ;   Device (Oxygen Therapy): room air  Body mass index is 47.7 kg/m².     Physical Exam  Vitals and nursing note reviewed.   Constitutional:       General: He is not in acute distress.     Appearance: He is obese. He is not ill-appearing, toxic-appearing or diaphoretic.   HENT:      Head: Normocephalic and atraumatic.      Right Ear: External ear normal.      Left Ear: External ear normal.      Nose: Nose normal.   Eyes:      General: No scleral icterus.        Right eye: No discharge.         Left eye: No discharge.      Extraocular Movements: Extraocular movements intact.      Conjunctiva/sclera: Conjunctivae normal.   Cardiovascular:      Rate and Rhythm: Normal rate and regular rhythm.      Pulses: Normal pulses.      Heart sounds: Normal heart sounds.   Pulmonary:      Effort: Pulmonary effort is normal. No respiratory distress.      Breath sounds: Normal breath sounds. No wheezing or rales.   Abdominal:      General: Bowel sounds are normal. There is no distension.      Palpations: Abdomen is soft.      Tenderness: There is no  abdominal tenderness.   Musculoskeletal:         General: Swelling present. Normal range of motion.      Cervical back: Neck supple. No rigidity.      Right lower le+ Edema present.      Left lower le+ Edema present.   Lymphadenopathy:      Cervical: No cervical adenopathy.   Skin:     General: Skin is warm and dry.      Coloration: Skin is not jaundiced.   Neurological:      General: No focal deficit present.      Mental Status: He is alert and oriented to person, place, and time. Mental status is at baseline.   Psychiatric:         Mood and Affect: Mood normal.         Behavior: Behavior normal.         Thought Content: Thought content normal.         Results Review     I reviewed the patient's new clinical results.  Results from last 7 days   Lab Units 21  0830 21  0651 12/15/21  0534 21  1655   WBC 10*3/mm3 6.41 8.17 6.98 7.70   HEMOGLOBIN g/dL 11.3* 11.4* 10.8* 11.5*   PLATELETS 10*3/mm3 287 290 279 284     Results from last 7 days   Lab Units 21  0830 21  0652 21  0651 12/15/21  0534   SODIUM mmol/L 143 140 140 145   POTASSIUM mmol/L 3.4* 3.6 3.3* 3.5   CHLORIDE mmol/L 110* 108* 107 112*   CO2 mmol/L 27.6 26.0 26.6 26.3   BUN mg/dL 11 12 13 10   CREATININE mg/dL 1.49* 1.66* 1.38* 1.55*   GLUCOSE mg/dL 93 94 97 90   EGFR IF AFRICN AM mL/min/1.73 61 54* 67 58*     Results from last 7 days   Lab Units 21  0652 21  0651 12/15/21  0534 21  1655   ALBUMIN g/dL 2.00* 1.60* 1.60* 1.70*   BILIRUBIN mg/dL 0.2 0.2 0.3 0.2   ALK PHOS U/L 80 74 73 85   AST (SGOT) U/L 10 13 11 15   ALT (SGPT) U/L 11 10 11 12     Results from last 7 days   Lab Units 21  0830 21  0652 21  0651 12/15/21  0534 21  1655 21  1655   CALCIUM mg/dL 7.7* 7.7* 7.9* 7.7*   < > 7.9*   ALBUMIN g/dL  --  2.00* 1.60* 1.60*  --  1.70*   MAGNESIUM mg/dL 1.9  --  1.9  --   --   --     < > = values in this interval not displayed.       COVID19   Date Value Ref Range  Status   12/14/2021 Not Detected Not Detected - Ref. Range Final     Hemoglobin A1C   Date/Time Value Ref Range Status   12/15/2021 0534 5.20 4.80 - 5.60 % Final       Duplex Renal Artery - Bilateral Complete CAR  · Normal right renal artery.  · Normal left renal artery.     Duplex Venous Lower Extremity - Bilateral CAR  · Normal bilateral lower extremity venous duplex scan.     Adult Transthoracic Echo Complete W/ Cont if Necessary Per Protocol  · Estimated left ventricular EF = 56% Estimated left ventricular EF was in   agreement with the calculated left ventricular EF. Left ventricular   systolic function is normal.  · Left ventricular wall thickness is consistent with moderate to severe   concentric hypertrophy.  · Mild lateral wall hypokinesis  · Left ventricular diastolic function is consistent with (grade I)   impaired relaxation.  · The right ventricular cavity is mildly dilated.  · Estimated right ventricular systolic pressure from tricuspid   regurgitation is normal (<35 mmHg).  · No aortic valve regurgitation or stenosis is present.  · Mild mitral valve regurgitation is present.  · Mild tricuspid valve regurgitation is present.     Scheduled Meds  [START ON 12/18/2021] amLODIPine, 10 mg, Oral, Q24H  bumetanide, 2 mg, Intravenous, Q8H  cloNIDine, 1 patch, Transdermal, Weekly  ferrous sulfate, 325 mg, Oral, BID With Meals  pravastatin, 20 mg, Oral, Nightly  vitamin D, 50,000 Units, Oral, Q7 Days    Continuous Infusions   PRN Meds  •  acetaminophen  •  aluminum-magnesium hydroxide-simethicone  •  hydrALAZINE  •  nitroglycerin  •  ondansetron **OR** ondansetron  •  sodium chloride      Diet  Diet Regular; Cardiac    I have personally reviewed:  [x]  Laboratory   [x]  Microbiology   [x]  Radiology   [x]  EKG/Telemetry   []  Cardiology/Vascular   []  Pathology   []  Some old records       Intake/Output Summary (Last 24 hours) at 12/17/2021 1247  Last data filed at 12/17/2021 1026  Gross per 24 hour   Intake 720  ml   Output 4470 ml   Net -3750 ml        Assessment/Plan     Active Hospital Problems    Diagnosis  POA   • **Anasarca [R60.1]  Yes   • Vitamin D deficiency [E55.9]  Yes   • Hypoalbuminemia [E88.09]  Yes   • Elevated serum creatinine [R79.89]  Yes   • Tobacco abuse [Z72.0]  Yes   • Class 3 severe obesity in adult (HCC) [E66.01]  Yes   • Dyspnea [R06.00]  Yes   • Hypertensive urgency [I16.0]  Yes   • CARROL (acute kidney injury) (HCC) [N17.9]  Yes   • Elevated brain natriuretic peptide (BNP) level [R79.89]  Yes   • Proteinuria [R80.9]  Yes   • Hematuria [R31.9]  Yes   • Anemia [D64.9]  Yes   • Elevated d-dimer [R79.89]  Yes      Resolved Hospital Problems   No resolved problems to display.       46 y.o. male admitted with Anasarca.    Workup here concerning for renal etiology of his anasarca: proteinuria, hematuria, elevated Cr, hypoalbuminemia, anemia, hypertensive urgency. EKG, Trop, CTA chest, A1c all reassuring.     Appreciate nephrology  Continue IV Bumex, Cr unchanged  Renal U/S okay, renal artery duplex okay  Renal biopsy possible Monday vs outpatient  Strict I/Os and daily weights    Iron studies inconclusive, hemoccult negative, Hgb stable    Started Coreg, hydralazine, and clonidine with some minor improvement, continue to monitor and adjust as needed. amlodipine added per nephrology    Echo consistent with longstanding untreated HTN, has some lateral wall hypokinesis and also had a 4.6 second pause (asymptomatic)card to see    Vitamin D quite low, start oral replacement    DDimer elevated, not specific. CTA neg for PE and venous duplex BLEs neg for DVT    · SCDs for DVT prophylaxis.  · Full code.  · Discussed with patient, nursing staff, CCP and care team on multidisciplinary rounds and Dr. Landry  · Anticipate discharge home with family, timing yet to be determined. after biopsy if inpatient or biopsy outpatient    Jac Neal MD  [facility]  12/17/21  12:44  EST        Electronically signed by Jac Neal MD at 12/17/21 1252     Marylu Landry MD at 12/17/21 0906              NEPHROLOGY PROGRESS NOTE    PATIENT IDENTIFICATION:   Name:  Alexis Packer      MRN:  [id number]     46 y.o.  male             Reason for visit: CARROL    SUBJECTIVE:   Seen and examined.  No new issues overnight.  Denied any shortness of air or chest pain.  Feeling much better.  OBJECTIVE:  Vitals:    12/17/21 0027 12/17/21 0604 12/17/21 0646 12/17/21 0658   BP: 178/98 164/92 170/94 141/75   BP Location:  Left arm Left arm Left arm   Patient Position: Lying Lying Lying Lying   Pulse: 86 86 88 90   Resp: 18   18   Temp: 98.3 °F (36.8 °C)   98.6 °F (37 °C)   TempSrc: Oral   Oral   SpO2: 99%   99%   Weight:       Height:               Body mass index is 50 kg/m².    Intake/Output Summary (Last 24 hours) at 12/17/2021 0906  Last data filed at 12/17/2021 0833  Gross per 24 hour   Intake 720 ml   Output 3720 ml   Net -3000 ml         Exam:  GEN:  No distress, appears stated age  EYES:   Anicteric sclera  ENT:    External ears/nose normal, MM are moist  NECK:  No adenopathy, JVP none  LUNGS: Normal chest on inspection; not labored  CV:  Normal S1S2, without murmur  ABD:  Non-tender, non-distended, no hepatosplenomegaly, +BS  EXT:  No edema; no cyanosis; clubbing    Scheduled meds:  amLODIPine, 5 mg, Oral, Q24H  bumetanide, 2 mg, Intravenous, Q8H  carvedilol, 6.25 mg, Oral, BID With Meals  cloNIDine, 1 patch, Transdermal, Weekly  vitamin D, 50,000 Units, Oral, Q7 Days      IV meds:                           Data Review:    Results from last 7 days   Lab Units 12/16/21  0652 12/16/21  0651 12/15/21  0534   SODIUM mmol/L 140 140 145   POTASSIUM mmol/L 3.6 3.3* 3.5   CHLORIDE mmol/L 108* 107 112*   CO2 mmol/L 26.0 26.6 26.3   BUN mg/dL 12 13 10   CREATININE mg/dL 1.66* 1.38* 1.55*   CALCIUM mg/dL 7.7* 7.9* 7.7*   BILIRUBIN mg/dL 0.2 0.2 0.3   ALK PHOS U/L 80 74 73   ALT (SGPT) U/L 11 10 11   AST  (SGOT) U/L 10 13 11   GLUCOSE mg/dL 94 97 90       Estimated Creatinine Clearance: 94.7 mL/min (A) (by C-G formula based on SCr of 1.66 mg/dL (H)).    Results from last 7 days   Lab Units 21  0651   MAGNESIUM mg/dL 1.9       Results from last 7 days   Lab Units 21  0651 12/15/21  0534 21  1655   WBC 10*3/mm3 8.17 6.98 7.70   HEMOGLOBIN g/dL 11.4* 10.8* 11.5*   PLATELETS 10*3/mm3 290 279 284                   ASSESSMENT:     Anasarca    Hypoalbuminemia    Elevated serum creatinine    Tobacco abuse    Class 3 severe obesity in adult ([facility])    Dyspnea    Hypertensive urgency    CARROL (acute kidney injury) ([facility])    Elevated brain natriuretic peptide (BNP) level    Proteinuria    Hematuria    Anemia    Elevated d-dimer    Vitamin D deficiency           - Proteinuria: Etiology is not very clear. He takes excessive NSAID;s. Will likely need a kidney biopsy. High D Dimer with proteinuria makes us worried about DVT/PE but CTA is negative.    - CARROL on CKD : Likely due to intravascular depletion and severe proteinuria. Will monitor kidney function closely  - High D Dimer: Had CTA  That was negative  - HTN:: Continue clonidine and Amlodipine and Coreg  - hypokalemia: Will check renin and aldosterone pending  - Hypocalcemia: Elevated PTH and low vitamin D  - Hyperlipidemia  -Secondary hyperparathyroidism    PLAN:      Awaiting morning labs.  GN work-up still pending.  Plan for biopsy on Monday  Start patient on vitamin D with secondary hyperparathyroidism  Start patient on iron tablet  Start patient on statin  Continue to diurese and increase the clonidine patch  Surveillance labs    Marylu Landry MD  2021    09:06 EST       Electronically signed by Marylu Landry MD at 21 0908     Mian Holcomb MD at 21 1601              Name: Alexis Packer ADMIT: 2021   : 1975  PCP: Provider, No Known    MRN: [id number] LOS: 0 days   AGE/SEX: 46 y.o. male  ROOM: [facility]     Subjective    Subjective   Feeling better today. Making urine and voiding well. Tolerating po. No SOA or CP or palp. No HA or vis changes.    Review of Systems    Objective   Objective   Vital Signs  Temp:  [98 °F (36.7 °C)-98.5 °F (36.9 °C)] 98.5 °F (36.9 °C)  Heart Rate:  [71-99] 90  Resp:  [18-20] 18  BP: (144-184)/() 153/98  SpO2:  [98 %-100 %] 99 %  on   ;   Device (Oxygen Therapy): room air  Body mass index is 50 kg/m².  Physical Exam  Vitals and nursing note reviewed.   Constitutional:       General: He is not in acute distress.     Appearance: He is obese. He is not ill-appearing, toxic-appearing or diaphoretic.   HENT:      Head: Normocephalic and atraumatic.      Right Ear: External ear normal.      Left Ear: External ear normal.      Nose: Nose normal.      Mouth/Throat:      Mouth: Mucous membranes are moist.      Pharynx: Oropharynx is clear.   Eyes:      General: No scleral icterus.        Right eye: No discharge.         Left eye: No discharge.      Extraocular Movements: Extraocular movements intact.      Conjunctiva/sclera: Conjunctivae normal.   Cardiovascular:      Rate and Rhythm: Normal rate and regular rhythm.      Pulses: Normal pulses.      Heart sounds: Normal heart sounds.   Pulmonary:      Effort: Pulmonary effort is normal. No respiratory distress.      Breath sounds: Normal breath sounds. No wheezing or rales.   Abdominal:      General: Bowel sounds are normal. There is no distension.      Palpations: Abdomen is soft.      Tenderness: There is no abdominal tenderness.   Musculoskeletal:         General: Swelling (3+ edema of BLEs and torso) present. Normal range of motion.      Cervical back: Neck supple. No rigidity.   Lymphadenopathy:      Cervical: No cervical adenopathy.   Skin:     General: Skin is warm and dry.      Capillary Refill: Capillary refill takes less than 2 seconds.      Coloration: Skin is not jaundiced.      Findings: No rash.   Neurological:      General: No focal deficit  present.      Mental Status: He is alert and oriented to person, place, and time. Mental status is at baseline.      Cranial Nerves: No cranial nerve deficit.      Coordination: Coordination normal.   Psychiatric:         Mood and Affect: Mood normal.         Behavior: Behavior normal.         Thought Content: Thought content normal.         Results Review     I reviewed the patient's new clinical results.  Results from last 7 days   Lab Units 12/16/21  0651 12/15/21  0534 12/14/21  1655   WBC 10*3/mm3 8.17 6.98 7.70   HEMOGLOBIN g/dL 11.4* 10.8* 11.5*   PLATELETS 10*3/mm3 290 279 284     Results from last 7 days   Lab Units 12/16/21  0652 12/16/21  0651 12/15/21  0534 12/14/21  1655   SODIUM mmol/L 140 140 145 144   POTASSIUM mmol/L 3.6 3.3* 3.5 3.7   CHLORIDE mmol/L 108* 107 112* 112*   CO2 mmol/L 26.0 26.6 26.3 24.7   BUN mg/dL 12 13 10 11   CREATININE mg/dL 1.66* 1.38* 1.55* 1.66*   GLUCOSE mg/dL 94 97 90 89   EGFR IF AFRICN AM mL/min/1.73 54* 67 58* 54*     Results from last 7 days   Lab Units 12/16/21  0652 12/16/21  0651 12/15/21  0534 12/14/21  1655   ALBUMIN g/dL 2.00* 1.60* 1.60* 1.70*   BILIRUBIN mg/dL 0.2 0.2 0.3 0.2   ALK PHOS U/L 80 74 73 85   AST (SGOT) U/L 10 13 11 15   ALT (SGPT) U/L 11 10 11 12     Results from last 7 days   Lab Units 12/16/21  0652 12/16/21  0651 12/15/21  0534 12/14/21  1655   CALCIUM mg/dL 7.7* 7.9* 7.7* 7.9*   ALBUMIN g/dL 2.00* 1.60* 1.60* 1.70*   MAGNESIUM mg/dL  --  1.9  --   --        COVID19   Date Value Ref Range Status   12/14/2021 Not Detected Not Detected - Ref. Range Final     Hemoglobin A1C   Date/Time Value Ref Range Status   12/15/2021 0534 5.20 4.80 - 5.60 % Final       Duplex Renal Artery - Bilateral Complete CAR  · Normal right renal artery.  · Normal left renal artery.     Duplex Venous Lower Extremity - Bilateral CAR  · Normal bilateral lower extremity venous duplex scan.     Adult Transthoracic Echo Complete W/ Cont if Necessary Per Protocol  · Estimated left  ventricular EF = 56% Estimated left ventricular EF was in   agreement with the calculated left ventricular EF. Left ventricular   systolic function is normal.  · Left ventricular wall thickness is consistent with moderate to severe   concentric hypertrophy.  · Mild lateral wall hypokinesis  · Left ventricular diastolic function is consistent with (grade I)   impaired relaxation.  · The right ventricular cavity is mildly dilated.  · Estimated right ventricular systolic pressure from tricuspid   regurgitation is normal (<35 mmHg).  · No aortic valve regurgitation or stenosis is present.  · Mild mitral valve regurgitation is present.  · Mild tricuspid valve regurgitation is present.       Scheduled Medications  amLODIPine, 5 mg, Oral, Q24H  bumetanide, 2 mg, Intravenous, Q8H  carvedilol, 6.25 mg, Oral, BID With Meals  cloNIDine, 1 patch, Transdermal, Weekly    Infusions   Diet  Diet Regular; Cardiac      Assessment/Plan     Active Hospital Problems    Diagnosis  POA   • **Anasarca [R60.1]  Yes   • Vitamin D deficiency [E55.9]  Yes   • Hypoalbuminemia [E88.09]  Yes   • Elevated serum creatinine [R79.89]  Yes   • Tobacco abuse [Z72.0]  Yes   • Class 3 severe obesity in adult (HCC) [E66.01]  Yes   • Dyspnea [R06.00]  Yes   • Hypertensive urgency [I16.0]  Yes   • CARROL (acute kidney injury) (HCC) [N17.9]  Yes   • Elevated brain natriuretic peptide (BNP) level [R79.89]  Yes   • Proteinuria [R80.9]  Yes   • Hematuria [R31.9]  Yes   • Anemia [D64.9]  Yes   • Elevated d-dimer [R79.89]  Yes      Resolved Hospital Problems   No resolved problems to display.       46 y.o. male admitted with Anasarca.    Workup here concerning for renal etiology of his anasarca: proteinuria, hematuria, elevated Cr, hypoalbuminemia, anemia, hypertensive urgency.  EKG, Trop, CTA chest, A1c all reassuring.     Appreciate Renal attention to pt  Continue IV Bumex, Cr really unchanged  Renal U/S okay, renal artery duplex fine  Will need renal biopsy at  some point  Strict I/Os and daily weights    Iron studies inconclusive, hemoccult negative, Hgb stable    Started Coreg, hydralazine, and clonidine with some minor improvement, continue to monitor and adjust as needed  Echo consistent with longstanding untreated HTN, has some lateral wall hypokinesis and also had a 4.6 second pause (asymptomatic) this afternoon, will ask Card to see, lytes are fine after replacing K+ orally    Vitamin D quite low, start oral replacement    DDimer elevated, fortunately CTA neg for PE and venous duplex BLEs neg for DVT      · SCDs for DVT prophylaxis.  · Full code.  · Discussed with patient, nursing staff, CCP and care team on multidisciplinary rounds.  · Anticipate discharge home with family, timing yet to be determined.      Mian Holcomb MD  [facility]  12/16/21  16:10 EST        Electronically signed by Mian Holcomb MD at 12/16/21 1626     Marylu Landry MD at 12/16/21 0736              NEPHROLOGY PROGRESS NOTE    PATIENT IDENTIFICATION:   Name:  Alexis Packer      MRN:  [id number]     46 y.o.  male             Reason for visit: CARROL    SUBJECTIVE:   Seen and examined.  No new issues overnight.  Denied any shortness of air or chest pain  OBJECTIVE:  Vitals:    12/15/21 2237 12/15/21 2246 12/15/21 2326 12/16/21 0430   BP:   144/92 (!) 166/102   BP Location:   Left arm Left arm   Patient Position:   Lying Lying   Pulse: 94   96   Resp: 18 18     Temp: 98 °F (36.7 °C)      TempSrc:       SpO2: 98%      Weight:       Height:               Body mass index is 50 kg/m².    Intake/Output Summary (Last 24 hours) at 12/16/2021 0736  Last data filed at 12/16/2021 0612  Gross per 24 hour   Intake 2272 ml   Output 3150 ml   Net -878 ml         Exam:  GEN:  No distress, appears stated age  EYES:   Anicteric sclera  ENT:    External ears/nose normal, MM are moist  NECK:  No adenopathy, JVP none  LUNGS: Normal chest on inspection; not labored  CV:  Normal S1S2,  without murmur  ABD:  Non-tender, non-distended, no hepatosplenomegaly, +BS  EXT:  No edema; no cyanosis; clubbing    Scheduled meds:  amLODIPine, 5 mg, Oral, Q24H  bumetanide, 2 mg, Intravenous, Q8H  carvedilol, 6.25 mg, Oral, BID With Meals  cloNIDine, 1 patch, Transdermal, Weekly      IV meds:                           Data Review:    Results from last 7 days   Lab Units 12/15/21  0534 12/14/21  1655   SODIUM mmol/L 145 144   POTASSIUM mmol/L 3.5 3.7   CHLORIDE mmol/L 112* 112*   CO2 mmol/L 26.3 24.7   BUN mg/dL 10 11   CREATININE mg/dL 1.55* 1.66*   CALCIUM mg/dL 7.7* 7.9*   BILIRUBIN mg/dL 0.3 0.2   ALK PHOS U/L 73 85   ALT (SGPT) U/L 11 12   AST (SGOT) U/L 11 15   GLUCOSE mg/dL 90 89       Estimated Creatinine Clearance: 101.4 mL/min (A) (by C-G formula based on SCr of 1.55 mg/dL (H)).          Results from last 7 days   Lab Units 12/15/21  0534 12/14/21  1655   WBC 10*3/mm3 6.98 7.70   HEMOGLOBIN g/dL 10.8* 11.5*   PLATELETS 10*3/mm3 279 284                   ASSESSMENT:     Anasarca    Hypoalbuminemia    Elevated serum creatinine    Tobacco abuse    Class 3 severe obesity in adult (HCC)    Dyspnea    Hypertensive urgency    CARROL (acute kidney injury) ([facility])    Elevated brain natriuretic peptide (BNP) level    Proteinuria    Hematuria    Anemia    Elevated d-dimer           - Proteinuria: Etiology is not very clear. He takes excessive NSAID;s. Will likely need a kidney biopsy. High D Dimer with proteinuria makes us worried about DVT/PE but CTA is negative.    - CARROL on CKD : Likely due to intravascular depletion and severe proteinuria. Will monitor kidney function closely  - High D Dimer: Had CTA  That was negative  - HTN:: Continue clonidine and Amlodipine and Coreg  - hypokalemia: Will check renin and aldosterone and renal doppler  - Hypocalcemia: Check PTH and Vit D    PLAN:      Awaiting morning labs.  Hepatitis profile negative.  Awaiting for the rest of GN work-up  Renal ultrasound is within normal  limits  Patient will likely need biopsy inpatient or outpatient  Continue current blood pressure medications might need to further escalate  Surveillance labs    Marylu Landry MD  2021    07:36 EST       Electronically signed by Marylu Landry MD at 21 0738          Consult Notes (last 48 hours)      Kaylee Brown APRN at 21 1450      Consult Orders    1. Cardiac Electrophysiologist Inpatient Consult [[id number]] ordered by Gloria Treviño APRN at 21 1027                     Electrophysiology Hospital Consult            Patient Name: Alexis Packer  Age/Sex: 46 y.o. male  : 1975  MRN: [id number]    Date of Admission: 2021  Date of Encounter Visit: 21  Encounter Provider: RADHA Herrera  Referring Provider: Jac Neal MD  Place of Service: [facility] CARDIOLOGY  Patient Care Team:  Provider, No Known as PCP - General      Subjective:   EP Consultation for: 4 second pause    Chief Complaint: Anasarca    History of Present Illness:  Alexis Packer is a 46 y.o. male with hypertension admitted on  with LE edema and dyspnea.     Was on meds for HTN in the past but was not taking them and hasn't for several years and has not seen a physician.     Yesterday afternoon he had a 4.2 second pause, asymptomatic, EP has been ask to see.     He denies chest pain, he is responding well to diuretics. He has never had syncope and no dizziness.     Past Medical History:  Past Medical History:   Diagnosis Date   • Hypertension        Past Surgical History:   Procedure Laterality Date   • TOE SURGERY         Home Medications:   No medications prior to admission.       Allergies:  No Known Allergies    Past Social History:  Social History     Socioeconomic History   • Marital status: Single   Tobacco Use   • Smoking status: Current Every Day Smoker     Types: Cigarettes   • Smokeless tobacco: Never Used   Vaping Use   • Vaping Use: Never  used   Substance and Sexual Activity   • Alcohol use: Never   • Drug use: Defer   • Sexual activity: Defer       Past Family History:  Family History   Problem Relation Age of Onset   • Diabetes Father        Review of Systems: All systems reviewed. Pertinent positives identified in HPI. All other systems are negative.     14 point ROS was performed and is negative except as outlined in HPI.     Objective:     Objective:  Vital Signs (last 24 hours)       12/16 0700  12/17 0659 12/17 0700  12/17 1156   Most Recent      Temp (°F) 98.3 -  98.8       98.6 (37) 12/17 0658    Heart Rate 82 -  99       90 12/17 0658    Resp   18       18 12/17 0658    /75 -  178/110      165/94     165/94 12/17 1015    SpO2 (%) 99 -  100       99 12/17 0658        Temp:  [98.3 °F (36.8 °C)-98.8 °F (37.1 °C)] 98.6 °F (37 °C)  Heart Rate:  [82-96] 90  Resp:  [18] 18  BP: (141-178)/() 165/94  Body mass index is 47.7 kg/m².        Physical Exam:     General Appearance: No acute distress, obese.   Eyes: Conjunctiva and lids: No erythema, swelling, or discharge. Sclera non-icteric.   HENT: Atraumatic, normocephalic. External eyes, ears, and nose normal.   Respiratory: No signs of respiratory distress. Respiration rhythm and depth normal.   Clear to auscultation. No rales, crackles, rhonchi, or wheezing auscultated.   Cardiovascular:  Heart Rate and Rhythm: Normal, Heart Sounds: Normal S1 and S2. No S3 or S4 noted.  Murmurs: No murmurs noted. No rubs, thrills, or gallops.  Lower Extremities: LE edema noted.   Gastrointestinal:  Abdomen obese, non-tender.   Musculoskeletal: Normal movement of extremities  Skin: Warm and dry.   Psychiatric: Patient alert and oriented to person, place, and time. Speech and behavior appropriate. Normal mood and affect.    Labs:   Lab Review:     Results from last 7 days   Lab Units 12/17/21  0830 12/16/21  0652 12/16/21  0651 12/15/21  0534 12/14/21  1655   SODIUM mmol/L 143 140 140 145 144   POTASSIUM  mmol/L 3.4* 3.6 3.3* 3.5 3.7   CHLORIDE mmol/L 110* 108* 107 112* 112*   CO2 mmol/L 27.6 26.0 26.6 26.3 24.7   BUN mg/dL 11 12 13 10 11   CREATININE mg/dL 1.49* 1.66* 1.38* 1.55* 1.66*   GLUCOSE mg/dL 93 94 97 90 89   CALCIUM mg/dL 7.7* 7.7* 7.9* 7.7* 7.9*   AST (SGOT) U/L  --  10 13 11 15   ALT (SGPT) U/L  --  11 10 11 12     Results from last 7 days   Lab Units 12/14/21  2200   TROPONIN T ng/mL <0.010     Results from last 7 days   Lab Units 12/17/21  0830   WBC 10*3/mm3 6.41   HEMOGLOBIN g/dL 11.3*   HEMATOCRIT % 35.4*   PLATELETS 10*3/mm3 287         Results from last 7 days   Lab Units 12/16/21  0651   CHOLESTEROL mg/dL 296*     Results from last 7 days   Lab Units 12/17/21  0830   MAGNESIUM mg/dL 1.9     Results from last 7 days   Lab Units 12/16/21  0651   CHOLESTEROL mg/dL 296*   TRIGLYCERIDES mg/dL 191*   HDL CHOL mg/dL 50   LDL CHOL mg/dL 209*     Results from last 7 days   Lab Units 12/14/21  2200   PROBNP pg/mL 1,627.0*         Imaging:     Interpretation Summary    · Estimated left ventricular EF = 56% Estimated left ventricular EF was in agreement with the calculated left ventricular EF. Left ventricular systolic function is normal.  · Left ventricular wall thickness is consistent with moderate to severe concentric hypertrophy.  · Mild lateral wall hypokinesis  · Left ventricular diastolic function is consistent with (grade I) impaired relaxation.  · The right ventricular cavity is mildly dilated.  · Estimated right ventricular systolic pressure from tricuspid regurgitation is normal (<35 mmHg).  · No aortic valve regurgitation or stenosis is present.  · Mild mitral valve regurgitation is present.  · Mild tricuspid valve regurgitation is present.     EKG/Tele:               I personally viewed and interpreted the patient's EKG/Telemetry tracings.    Assessment:       Anasarca    Hypoalbuminemia    Elevated serum creatinine    Tobacco abuse    Class 3 severe obesity in adult (HCC)    Dyspnea     Hypertensive urgency    CARROL (acute kidney injury) (HCC)    Elevated brain natriuretic peptide (BNP) level    Proteinuria    Hematuria    Anemia    Elevated d-dimer    Vitamin D deficiency        Plan:     Dr. Hill and I saw patient this afternoon.     Reviewed tele and EKG--- the pause is mostly secondary to sleep apnea. No indication for pacemaker.     He has not had any dizziness, near syncope or syncope. Consult placed ot sleep medicine to get him set up for evaluation.     Reviewed echo---had long discussion with him regarding the importance of blood pressure control, weight loss, low sodium diet and regular exercise---discussed hypertensive heart & renal disease.     He says he is going to take care of himself.     He would probably be a great candidate for SGLT2, will defer to primary team.     EP will sign off.     Thank you for allowing me to participate in the care of Alexis Packer. Feel free to contact me directly with any further questions or concerns.    RADHA Herrera  [facility]  21  1450    Electronically signed by Kaylee Brown APRN at 21 1514     Gloria Treviño APRN at 21 0738      Consult Orders    1. Inpatient Cardiology Consult [[id number]] ordered by Mian Holcomb MD at 21 1611                   Patient Name: Alexis Packer  :1975  46 y.o.    Date of Admission: 2021  Date of Consultation:  21  Encounter Provider: RADHA Li  Place of Service: [facility] CARDIOLOGY  Referring Provider: Jac Neal MD  Patient Care Team:  Provider, No Known as PCP - General      Chief complaint: Anasarca    Reason for Consult: Hypertension/ 4.2 second pause    History of Present Illness:    Mr Packer is a 46 year old patient with a history of hypertension who was admitted on 2021 with bilateral LE edema and SOA.  He was very hypertensive on arrival with B/P 209/143 and EKG showing sinus  tachycardia. His BNP was elevated at 1627 and creatinine elevated at 1.66.  CXR showed cardiomegaly with vascular congestion. He was negative PE despite elevated D-dimer.     He was started on beta blocker, clonidine, and amlodipine along with IV diuretics. His blood pressure has improved since that time but he still is requiring PRN hydralazine.     ECHO was done which showed EF 56% with normal LV systolic function, moderate to severe concentric hypertrophy, mild lateral wall hypokinesis, RV cavity mildly dilated, mild MR and mild TR    He took antihypertensive medications in the past but quit them a couple years ago and has not seen a physician in a couple years.  Over the last couple of days he noticed some increase in edema.  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  He is got swelling in his legs abdomen and he said he feels very bloated.  He denies any pain in the chest tightness or pressure he does not have any complaints of palpitations, syncope or near syncope.    Yesterday he had a 4.2 second pause at 0200.  It appears that he had some second-degree type II heart block followed by a junctional rhythm.  He was asymptomatic. His electrolytes are normal.  His girlfriend is in the room with him who states he does snore at night and appears to have periods of apnea.      Telemetry      Previous Cardiac Testing   ECHO 12/16/2021  · Estimated left ventricular EF = 56% Estimated left ventricular EF was in agreement with the calculated left ventricular EF. Left ventricular systolic function is normal.  · Left ventricular wall thickness is consistent with moderate to severe concentric hypertrophy.  · Mild lateral wall hypokinesis  · Left ventricular diastolic function is consistent with (grade I) impaired relaxation.  · The right ventricular cavity is mildly dilated.  · Estimated right ventricular systolic pressure from tricuspid regurgitation is  normal (<35 mmHg).  · No aortic valve regurgitation or stenosis is present.  · Mild mitral valve regurgitation is present.  · Mild tricuspid valve regurgitation is present.         Past Medical History:   Diagnosis Date   • Hypertension        Past Surgical History:   Procedure Laterality Date   • TOE SURGERY           Prior to Admission medications    Not on File       No Known Allergies    Social History     Socioeconomic History   • Marital status: Single   Tobacco Use   • Smoking status: Current Every Day Smoker     Types: Cigarettes   • Smokeless tobacco: Never Used   Vaping Use   • Vaping Use: Never used   Substance and Sexual Activity   • Alcohol use: Never   • Drug use: Defer   • Sexual activity: Defer       Family History   Problem Relation Age of Onset   • Diabetes Father        REVIEW OF SYSTEMS:        CONSTITUTIONAL:  No weight loss. No fever or chills. No weakness. No fatigue.                           HEENT:  Normocephalic. Symmetrical. Atraumatic.                               Eyes: No visual loss. No blurred vision. No double vision. No icterus.                               Ears: No hearing loss. No drainage.                              Nose:  No sneezing. No congestion. No runny nose. No sore throat.           CARDIAC:         No chest pain, palpitations, syncope or near syncope. edema            RESPIRATORY:  + shortness of breath. No hemoptysis. No cough or sputum production. No wheezes.   GASTROINTESTINAL:         No anorexia. No nausea. No vomiting. No diarrhea. No abdominal pain. No distention. No bleeding from rectum. No melena.         GENITOURINARY:  No burning on urination. No hematuria. No frequency.  MUSCULOSKELETAL: No myalgia. No stiffness. No gait disturbances.                              SKIN:  No rash. No itching. No pallor.           NEUROLOGICAL: No headache. No dizziness. No syncope. No paralysis. No ataxia. No numbness. No tingling. No change in bowel or bladder control. No  "speech difficulty.            HEMATOLOGIC: No bleeding. No bruising.                PSYCHIATRIC: No depression. No anxiety. No confusion. No sleep disturbances. No hallucinations.       ENDOCRINOLOGIC: No diaphoresis. No heat or cold intolerance. No polyuria or polydipsia.       Objective:     Vitals:    12/17/21 0646 12/17/21 0658 12/17/21 0900 12/17/21 1015   BP: 170/94 141/75  165/94   BP Location: Left arm Left arm  Left arm   Patient Position: Lying Lying  Sitting   Pulse: 88 90     Resp:  18     Temp:  98.6 °F (37 °C)     TempSrc:  Oral     SpO2:  99%     Weight:   (!) 173 kg (381 lb 9.6 oz)    Height:   190.5 cm (75\")      Body mass index is 47.7 kg/m².    Physical Exam:   General Appearance:    Awake alert and oriented in no acute distress.   Color:  Skin:  Neuro:  HEENT:    Lungs:     Pink  Warm and dry  No focal, motor or sensory deficits  Neck supple, pupils equal, round and reactive. No JVD, No Bruit  Clear to auscultation,respirations regular, even and                  unlabored    Heart:    Regular rate and rhythm, S1 and S2, no murmur, no gallop, no rub. 1+ edema, DP/PT pulses are 2+   Chest Wall:    No abnormalities observed   Abdomen:     Normal bowel sounds, no masses, no organomegaly, soft        non-tender, non-distended, no guarding, no ascites noted   Extremities:   Moves all extremities well, no edema, no cyanosis, no redness       Lab Review:     Results from last 7 days   Lab Units 12/17/21  0830 12/16/21  0652 12/16/21  0652   SODIUM mmol/L 143   < > 140   POTASSIUM mmol/L 3.4*   < > 3.6   CHLORIDE mmol/L 110*   < > 108*   CO2 mmol/L 27.6   < > 26.0   BUN mg/dL 11   < > 12   CREATININE mg/dL 1.49*   < > 1.66*   CALCIUM mg/dL 7.7*   < > 7.7*   BILIRUBIN mg/dL  --   --  0.2   ALK PHOS U/L  --   --  80   ALT (SGPT) U/L  --   --  11   AST (SGOT) U/L  --   --  10   GLUCOSE mg/dL 93   < > 94    < > = values in this interval not displayed.     Results from last 7 days   Lab Units 12/14/21  2200 "   TROPONIN T ng/mL <0.010     Results from last 7 days   Lab Units 12/17/21  0830   WBC 10*3/mm3 6.41   HEMOGLOBIN g/dL 11.3*   HEMATOCRIT % 35.4*   PLATELETS 10*3/mm3 287         Results from last 7 days   Lab Units 12/17/21  0830   MAGNESIUM mg/dL 1.9     Results from last 7 days   Lab Units 12/16/21  0651   CHOLESTEROL mg/dL 296*   TRIGLYCERIDES mg/dL 191*   HDL CHOL mg/dL 50   LDL CHOL mg/dL 209*              EKG this admission      No Previous EKG for comparison    I personally viewed and interpreted the patient's EKG/Telemetry data.        Assessment and Plan:       1.  Anasarca  2.  Severe LVH  3.  Acute diastolic heart failure  4.  Uncontrolled hypertension  5.  Second-degree type II heart block with 4.2-second pause followed by junctional rhythm while sleeping  6.  Most likely obstructive sleep apnea  7.  Acute kidney injury    Echocardiogram reviewed EF normal he has got some mild aortic insufficiency he is got also has moderate to severe LVH.  I think his diastolic dysfunction, kidney disease and uncontrolled blood pressures are all contributing to his fluid retention nephrology is following and they have him on IV Bumex.  I Sara stop his carvedilol and increase his amlodipine to 10 mg a day.  I think he needs to stay in the hospital for another day or 2 to get tuned up.    RADHA Li  12/17/21  10:28 EST                Electronically signed by Gloria Treviño APRN at 12/17/21 1032     Marylu Landry MD at 12/15/21 1545      Consult Orders    1. Inpatient Nephrology Consult [[id number]] ordered by Elke Chavez APRN at 12/15/21 0940                 Referring Provider: Elke Chavez APRN   Reason for Consultation: CARROL    Subjective     Chief complaint   Chief Complaint   Patient presents with   • Leg Swelling   • Groin Swelling   • Shortness of Breath       History of present illness:      47 Y/O AAM who was not seen by doctor before who came in due to worsening LE and  "TOWNSEND. Labs showed severe proteinuria. CR was 1.6 mg/dl on admission. He had CTA to rule out PE. Patient does not have family history of ESRD. He takes daily excessive NSAID's                 Past Medical History:   Diagnosis Date   • Hypertension      Past Surgical History:   Procedure Laterality Date   • TOE SURGERY       Family History   Problem Relation Age of Onset   • Diabetes Father      Social History     Tobacco Use   • Smoking status: Current Every Day Smoker     Types: Cigarettes   • Smokeless tobacco: Never Used   Vaping Use   • Vaping Use: Never used   Substance Use Topics   • Alcohol use: Never   • Drug use: Defer     (Not in a hospital admission)    Allergies:  Patient has no known allergies.    Review of Systems  Pertinent items are noted in HPI.    Objective     Vital Signs  Temp:  [98.4 °F (36.9 °C)] 98.4 °F (36.9 °C)  Heart Rate:  [] 92  Resp:  [20] 20  BP: (177-209)/(106-143) 186/121    Flowsheet Rows      First Filed Value   Admission Height 190.5 cm (75\") Documented at 12/14/2021 2201   Admission Weight 181 kg (400 lb) Documented at 12/14/2021 2201           I/O this shift:  In: 720 [P.O.:720]  Out: 1500 [Urine:1500]  No intake/output data recorded.    Intake/Output Summary (Last 24 hours) at 12/15/2021 1545  Last data filed at 12/15/2021 1528  Gross per 24 hour   Intake 720 ml   Output 1500 ml   Net -780 ml       Physical Exam:     General Appearance:    Alert, cooperative, in no acute distress   Head:    Normocephalic, without obvious abnormality, atraumatic   Eyes:            Lids and lashes normal, conjunctivae and sclerae normal, no   icterus, no pallor, corneas clear, PERRLA   Ears:    Ears appear intact with no abnormalities noted   Throat:   No oral lesions, no thrush, oral mucosa moist   Neck:   No adenopathy, supple, trachea midline, no thyromegaly, no   carotid bruit, no JVD   Back:     No kyphosis present, no scoliosis present, no skin lesions,      erythema or scars, no " tenderness to percussion or                   palpation,   range of motion normal   Lungs:     Clear to auscultation,respirations regular, even and                  unlabored    Heart:    Regular rhythm and normal rate, normal S1 and S2, no            murmur, no gallop, no rub, no click   Chest Wall:    No abnormalities observed   Abdomen:     Normal bowel sounds, no masses, no organomegaly, soft        non-tender, non-distended, no guarding, no rebound                tenderness   Rectal:     Deferred   Extremities:   Moves all extremities well, +++ edema, no cyanosis, no             redness   Pulses:   Pulses palpable and equal bilaterally   Skin:   No bleeding, bruising or rash   Lymph nodes:   No palpable adenopathy   Neurologic:   Cranial nerves 2 - 12 grossly intact, sensation intact, DTR       present and equal bilaterally       Results Review:  Results from last 7 days   Lab Units 12/15/21  0534 12/14/21  1655   SODIUM mmol/L 145 144   POTASSIUM mmol/L 3.5 3.7   CHLORIDE mmol/L 112* 112*   CO2 mmol/L 26.3 24.7   BUN mg/dL 10 11   CREATININE mg/dL 1.55* 1.66*   CALCIUM mg/dL 7.7* 7.9*   BILIRUBIN mg/dL 0.3 0.2   ALK PHOS U/L 73 85   ALT (SGPT) U/L 11 12   AST (SGOT) U/L 11 15   GLUCOSE mg/dL 90 89       Estimated Creatinine Clearance: 101.4 mL/min (A) (by C-G formula based on SCr of 1.55 mg/dL (H)).          Results from last 7 days   Lab Units 12/15/21  0534 12/14/21  1655   WBC 10*3/mm3 6.98 7.70   HEMOGLOBIN g/dL 10.8* 11.5*   PLATELETS 10*3/mm3 279 284             Active Medications  carvedilol, 6.25 mg, Oral, BID With Meals  furosemide, 40 mg, Intravenous, Q12H           Assessment/Plan   Assessment      Anasarca    Hypoalbuminemia    Elevated serum creatinine    Tobacco abuse    Class 3 severe obesity in adult ([facility])    Dyspnea    Hypertensive urgency    CARROL (acute kidney injury) ([facility])    Elevated brain natriuretic peptide (BNP) level    Proteinuria    Hematuria    Anemia    Elevated d-dimer    -  Proteinuria: Etiology is not very clear. He takes excessive NSAID;s. Will likely need a kidney biopsy. Will send GN work-up. Stat aggressive diuresis. High D Dimer with proteinuria makes us worried about DVT/PE but CTA is negative. Avoid NSAID's. Will need to monitor kidney function after CTA.     - CARROL on CKD : Likely due to intravascular depletion and severe proteinuria. Will monitor kidney function closely  - High D Dimer: Had CTA  That was negative  - HTN:: Start Clonidine and Amlodipine  - hypokalemia: Will check renin and aldosterone and renal doppler  - Hypocalcemia: Check PTH and Vit D        Marylu Landry MD  12/15/21  15:45 EST                Electronically signed by Marylu Landry MD at 12/15/21 1668

## 2021-12-17 NOTE — PROGRESS NOTES
NEPHROLOGY PROGRESS NOTE    PATIENT IDENTIFICATION:   Name:  Alexis Packer      MRN:  [id number]     46 y.o.  male             Reason for visit: CARROL    SUBJECTIVE:   Seen and examined.  No new issues overnight.  Denied any shortness of air or chest pain.  Feeling much better.  OBJECTIVE:  Vitals:    12/17/21 0027 12/17/21 0604 12/17/21 0646 12/17/21 0658   BP: 178/98 164/92 170/94 141/75   BP Location:  Left arm Left arm Left arm   Patient Position: Lying Lying Lying Lying   Pulse: 86 86 88 90   Resp: 18   18   Temp: 98.3 °F (36.8 °C)   98.6 °F (37 °C)   TempSrc: Oral   Oral   SpO2: 99%   99%   Weight:       Height:               Body mass index is 50 kg/m².    Intake/Output Summary (Last 24 hours) at 12/17/2021 0906  Last data filed at 12/17/2021 0833  Gross per 24 hour   Intake 720 ml   Output 3720 ml   Net -3000 ml         Exam:  GEN:  No distress, appears stated age  EYES:   Anicteric sclera  ENT:    External ears/nose normal, MM are moist  NECK:  No adenopathy, JVP none  LUNGS: Normal chest on inspection; not labored  CV:  Normal S1S2, without murmur  ABD:  Non-tender, non-distended, no hepatosplenomegaly, +BS  EXT:  No edema; no cyanosis; clubbing    Scheduled meds:  amLODIPine, 5 mg, Oral, Q24H  bumetanide, 2 mg, Intravenous, Q8H  carvedilol, 6.25 mg, Oral, BID With Meals  cloNIDine, 1 patch, Transdermal, Weekly  vitamin D, 50,000 Units, Oral, Q7 Days      IV meds:                           Data Review:    Results from last 7 days   Lab Units 12/16/21  0652 12/16/21  0651 12/15/21  0534   SODIUM mmol/L 140 140 145   POTASSIUM mmol/L 3.6 3.3* 3.5   CHLORIDE mmol/L 108* 107 112*   CO2 mmol/L 26.0 26.6 26.3   BUN mg/dL 12 13 10   CREATININE mg/dL 1.66* 1.38* 1.55*   CALCIUM mg/dL 7.7* 7.9* 7.7*   BILIRUBIN mg/dL 0.2 0.2 0.3   ALK PHOS U/L 80 74 73   ALT (SGPT) U/L 11 10 11   AST (SGOT) U/L 10 13 11   GLUCOSE mg/dL 94 97 90       Estimated Creatinine Clearance: 94.7 mL/min (A) (by C-G formula based on SCr of  1.66 mg/dL (H)).    Results from last 7 days   Lab Units 12/16/21  0651   MAGNESIUM mg/dL 1.9       Results from last 7 days   Lab Units 12/16/21  0651 12/15/21  0534 12/14/21  1655   WBC 10*3/mm3 8.17 6.98 7.70   HEMOGLOBIN g/dL 11.4* 10.8* 11.5*   PLATELETS 10*3/mm3 290 279 284                   ASSESSMENT:     Anasarca    Hypoalbuminemia    Elevated serum creatinine    Tobacco abuse    Class 3 severe obesity in adult ([facility])    Dyspnea    Hypertensive urgency    CARROL (acute kidney injury) ([facility])    Elevated brain natriuretic peptide (BNP) level    Proteinuria    Hematuria    Anemia    Elevated d-dimer    Vitamin D deficiency           - Proteinuria: Etiology is not very clear. He takes excessive NSAID;s. Will likely need a kidney biopsy. High D Dimer with proteinuria makes us worried about DVT/PE but CTA is negative.    - CARROL on CKD : Likely due to intravascular depletion and severe proteinuria. Will monitor kidney function closely  - High D Dimer: Had CTA  That was negative  - HTN:: Continue clonidine and Amlodipine and Coreg  - hypokalemia: Will check renin and aldosterone pending  - Hypocalcemia: Elevated PTH and low vitamin D  - Hyperlipidemia  -Secondary hyperparathyroidism    PLAN:      Awaiting morning labs.  GN work-up still pending.  Plan for biopsy on Monday  Start patient on vitamin D with secondary hyperparathyroidism  Start patient on iron tablet  Start patient on statin  Continue to diurese and increase the clonidine patch  Surveillance labs    Marylu Landry MD  12/17/2021    09:06 EST

## 2021-12-17 NOTE — PROGRESS NOTES
Name: Alexis Packer ADMIT: 2021   : 1975  PCP: Provider, No Known    MRN: [id number] LOS: 1 days   AGE/SEX: 46 y.o. male  ROOM: [facility]     Subjective   Subjective    no complaints. wanting to go home. states swelling improved    Review of Systems   Constitutional: Negative.  Negative for chills and fever.   HENT: Negative.    Eyes: Negative.    Respiratory: Negative.  Negative for cough, chest tightness and shortness of breath.    Cardiovascular: Positive for leg swelling. Negative for chest pain.   Gastrointestinal: Negative.  Negative for abdominal pain, diarrhea, nausea and vomiting.   Endocrine: Negative.    Genitourinary: Negative.    Musculoskeletal: Negative.    Skin: Negative.    Allergic/Immunologic: Negative.    Neurological: Negative.    Hematological: Negative.    Psychiatric/Behavioral: Negative.       Objective   Objective   Vital Signs  Temp:  [98.3 °F (36.8 °C)-98.8 °F (37.1 °C)] 98.6 °F (37 °C)  Heart Rate:  [82-96] 90  Resp:  [18] 18  BP: (141-178)/() 165/94  SpO2:  [99 %-100 %] 99 %  on   ;   Device (Oxygen Therapy): room air  Body mass index is 47.7 kg/m².     Physical Exam  Vitals and nursing note reviewed.   Constitutional:       General: He is not in acute distress.     Appearance: He is obese. He is not ill-appearing, toxic-appearing or diaphoretic.   HENT:      Head: Normocephalic and atraumatic.      Right Ear: External ear normal.      Left Ear: External ear normal.      Nose: Nose normal.   Eyes:      General: No scleral icterus.        Right eye: No discharge.         Left eye: No discharge.      Extraocular Movements: Extraocular movements intact.      Conjunctiva/sclera: Conjunctivae normal.   Cardiovascular:      Rate and Rhythm: Normal rate and regular rhythm.      Pulses: Normal pulses.      Heart sounds: Normal heart sounds.   Pulmonary:      Effort: Pulmonary effort is normal. No respiratory distress.      Breath sounds: Normal breath sounds. No wheezing or  rales.   Abdominal:      General: Bowel sounds are normal. There is no distension.      Palpations: Abdomen is soft.      Tenderness: There is no abdominal tenderness.   Musculoskeletal:         General: Swelling present. Normal range of motion.      Cervical back: Neck supple. No rigidity.      Right lower le+ Edema present.      Left lower le+ Edema present.   Lymphadenopathy:      Cervical: No cervical adenopathy.   Skin:     General: Skin is warm and dry.      Coloration: Skin is not jaundiced.   Neurological:      General: No focal deficit present.      Mental Status: He is alert and oriented to person, place, and time. Mental status is at baseline.   Psychiatric:         Mood and Affect: Mood normal.         Behavior: Behavior normal.         Thought Content: Thought content normal.         Results Review     I reviewed the patient's new clinical results.  Results from last 7 days   Lab Units 21  0830 21  0651 12/15/21  0534 21  1655   WBC 10*3/mm3 6.41 8.17 6.98 7.70   HEMOGLOBIN g/dL 11.3* 11.4* 10.8* 11.5*   PLATELETS 10*3/mm3 287 290 279 284     Results from last 7 days   Lab Units 21  0830 21  0652 21  0651 12/15/21  0534   SODIUM mmol/L 143 140 140 145   POTASSIUM mmol/L 3.4* 3.6 3.3* 3.5   CHLORIDE mmol/L 110* 108* 107 112*   CO2 mmol/L 27.6 26.0 26.6 26.3   BUN mg/dL 11 12 13 10   CREATININE mg/dL 1.49* 1.66* 1.38* 1.55*   GLUCOSE mg/dL 93 94 97 90   EGFR IF AFRICN AM mL/min/1.73 61 54* 67 58*     Results from last 7 days   Lab Units 21  0652 21  0651 12/15/21  0534 21  1655   ALBUMIN g/dL 2.00* 1.60* 1.60* 1.70*   BILIRUBIN mg/dL 0.2 0.2 0.3 0.2   ALK PHOS U/L 80 74 73 85   AST (SGOT) U/L 10 13 11 15   ALT (SGPT) U/L 11 10 11 12     Results from last 7 days   Lab Units 21  0830 21  0652 21  0651 12/15/21  0534 21  1655 21  1655   CALCIUM mg/dL 7.7* 7.7* 7.9* 7.7*   < > 7.9*   ALBUMIN g/dL  --  2.00* 1.60* 1.60*   --  1.70*   MAGNESIUM mg/dL 1.9  --  1.9  --   --   --     < > = values in this interval not displayed.       COVID19   Date Value Ref Range Status   12/14/2021 Not Detected Not Detected - Ref. Range Final     Hemoglobin A1C   Date/Time Value Ref Range Status   12/15/2021 0534 5.20 4.80 - 5.60 % Final       Duplex Renal Artery - Bilateral Complete CAR  · Normal right renal artery.  · Normal left renal artery.     Duplex Venous Lower Extremity - Bilateral CAR  · Normal bilateral lower extremity venous duplex scan.     Adult Transthoracic Echo Complete W/ Cont if Necessary Per Protocol  · Estimated left ventricular EF = 56% Estimated left ventricular EF was in   agreement with the calculated left ventricular EF. Left ventricular   systolic function is normal.  · Left ventricular wall thickness is consistent with moderate to severe   concentric hypertrophy.  · Mild lateral wall hypokinesis  · Left ventricular diastolic function is consistent with (grade I)   impaired relaxation.  · The right ventricular cavity is mildly dilated.  · Estimated right ventricular systolic pressure from tricuspid   regurgitation is normal (<35 mmHg).  · No aortic valve regurgitation or stenosis is present.  · Mild mitral valve regurgitation is present.  · Mild tricuspid valve regurgitation is present.     Scheduled Meds  [START ON 12/18/2021] amLODIPine, 10 mg, Oral, Q24H  bumetanide, 2 mg, Intravenous, Q8H  cloNIDine, 1 patch, Transdermal, Weekly  ferrous sulfate, 325 mg, Oral, BID With Meals  pravastatin, 20 mg, Oral, Nightly  vitamin D, 50,000 Units, Oral, Q7 Days    Continuous Infusions   PRN Meds  •  acetaminophen  •  aluminum-magnesium hydroxide-simethicone  •  hydrALAZINE  •  nitroglycerin  •  ondansetron **OR** ondansetron  •  sodium chloride      Diet  Diet Regular; Cardiac    I have personally reviewed:  [x]  Laboratory   [x]  Microbiology   [x]  Radiology   [x]  EKG/Telemetry   []  Cardiology/Vascular   []  Pathology   []  Some  old records       Intake/Output Summary (Last 24 hours) at 12/17/2021 1247  Last data filed at 12/17/2021 1026  Gross per 24 hour   Intake 720 ml   Output 4470 ml   Net -3750 ml         Assessment/Plan     Active Hospital Problems    Diagnosis  POA   • **Anasarca [R60.1]  Yes   • Vitamin D deficiency [E55.9]  Yes   • Hypoalbuminemia [E88.09]  Yes   • Elevated serum creatinine [R79.89]  Yes   • Tobacco abuse [Z72.0]  Yes   • Class 3 severe obesity in adult (HCC) [E66.01]  Yes   • Dyspnea [R06.00]  Yes   • Hypertensive urgency [I16.0]  Yes   • CARROL (acute kidney injury) (HCC) [N17.9]  Yes   • Elevated brain natriuretic peptide (BNP) level [R79.89]  Yes   • Proteinuria [R80.9]  Yes   • Hematuria [R31.9]  Yes   • Anemia [D64.9]  Yes   • Elevated d-dimer [R79.89]  Yes      Resolved Hospital Problems   No resolved problems to display.       46 y.o. male admitted with Anasarca.    Workup here concerning for renal etiology of his anasarca: proteinuria, hematuria, elevated Cr, hypoalbuminemia, anemia, hypertensive urgency. EKG, Trop, CTA chest, A1c all reassuring.     Appreciate nephrology  Continue IV Bumex, Cr unchanged  Renal U/S okay, renal artery duplex okay  Renal biopsy possible Monday vs outpatient  Strict I/Os and daily weights    Iron studies inconclusive, hemoccult negative, Hgb stable    Started Coreg, hydralazine, and clonidine with some minor improvement, continue to monitor and adjust as needed. amlodipine added per nephrology    Echo consistent with longstanding untreated HTN, has some lateral wall hypokinesis and also had a 4.6 second pause (asymptomatic)card to see    Vitamin D quite low, start oral replacement    DDimer elevated, not specific. CTA neg for PE and venous duplex BLEs neg for DVT    · SCDs for DVT prophylaxis.  · Full code.  · Discussed with patient, nursing staff, CCP and care team on multidisciplinary rounds and Dr. Landry  · Anticipate discharge home with family, timing yet to be  determined. after biopsy if inpatient or biopsy outpatient    Jac Neal MD  [facility]  12/17/21  12:44 EST

## 2021-12-17 NOTE — CONSULTS
[facility]  [phone]  Marino Bacon MD      Subjective   LOS: 1 day     I reviewed his consultation. 46-year-old male who was admitted with worsening edema and dyspnea on exertion. He has been seen by nephrology and cardiology. He has fluid overload related to uncontrolled blood pressure. He is a current cigarette smoker. They would like us to evaluate him for sleep apnea.    Patient states he sleeps from about 3 AM to 10 AM. 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 He wakes up feeling okay. He has very loud snoring at night when he sleeps. He is unaware of witnessed apneas. He reports feeling very tired but relates this to his vigorous labor. He is a current cigarette smoker.    Alexis Packer  reports no history of alcohol use.,  reports that he has been smoking cigarettes. He has never used smokeless tobacco.     Past Hx:  has a past medical history of Hypertension.  Surg Hx:  has a past surgical history that includes Toe Surgery.  FH: family history includes Diabetes in his father.  SH:  reports that he has been smoking cigarettes. He has never used smokeless tobacco. Drug use questions deferred to the physician. He reports that he does not drink alcohol.    No medications prior to admission.     No Known Allergies    Review of Systems   Constitutional: Negative for chills and fever.   HENT: Negative for congestion and sore throat.    Respiratory: Positive for shortness of breath. Negative for wheezing.    Cardiovascular: Positive for leg swelling. Negative for chest pain.   Gastrointestinal: Negative for abdominal pain, nausea and vomiting.   Genitourinary: Negative for dysuria and hematuria.   Musculoskeletal: Negative for arthralgias and back pain.   Skin: Negative for pallor and rash.   Neurological: Negative for dizziness and headaches.   Psychiatric/Behavioral: Negative for agitation and confusion.     Vital Signs past 24hrs  BP range: BP: (141-178)/() 170/90  Pulse range: Heart Rate:  [82-96]  85  Resp rate range: Resp:  [16-18] 16  Temp range: Temp (24hrs), Av.5 °F (36.9 °C), Min:98.2 °F (36.8 °C), Max:98.8 °F (37.1 °C)    Oxygen range: SpO2:  [97 %-99 %] 97 %;  ;   Device (Oxygen Therapy): room air  (!) 173 kg (381 lb 9.6 oz); Body mass index is 47.7 kg/m².  I/O this shift:  In: 480 [P.O.:480]  Out: 3315 [Urine:3315]    Adult male laying in bed looks comfortable. Currently on room air pupils equal and reactive light. Oropharynx class IV Mallampati airway. Large tongue. Nasopharynx no discharge. JVP not elevated trachea midline thyroid not enlarged. Neck is large in size making all of this evaluation difficult. Lungs reveal bilateral air entry clear to auscultation no rales rhonchi wheeze. Percussion note resonant chest expansion equal no chest wall deformity or tenderness. Heart examination S1-S2 present rhythm regular no murmurs. At least 2+ edema lower extremities. Abdomen is morbidly obese soft nontender bowel sounds present no liver spleen enlargement. No peripheral cyanosis clubbing. Moves all four extremities sensorimotor intact. No cervical, axillary, inguinal adenopathy.    Results Review:    I have reviewed the laboratory and imaging data from current admission. My annotations are as noted in assessment and plan.    Medication Review:  I have reviewed the current MAR. My annotations are as noted in assessment and plan.    Plan   PCCM Problems  Suspected obstructive sleep apnea  Loud snoring  Hypersomnolence  Morbid obesity  Uncontrolled high blood pressure  Acute kidney injury  Current cigarette smoker  Hypoalbuminemia      Plan of Treatment  Patient requires evaluation for sleep disordered breathing. Recommend outpatient sleep study. Can start with a home sleep test to see if adequate. If positive patient requires treatment with CPAP device. I discussed the risks of untreated sleep apnea. Patient appears agreeable. I will also get a overnight oximetry to see extent of desaturation with  sleep.    Patient strongly advised to quit smoking. Long-term health effects of smoking was discussed.    Electronically signed by Marino Bacon MD, 12/17/21, 5:55 PM EST.      Part of this note may be an electronic transcription/translation of spoken language to printed text using the Dragon Dictation System.

## 2021-12-17 NOTE — CONSULTS
Patient Name: Alexis Packer  :1975  46 y.o.    Date of Admission: 2021  Date of Consultation:  21  Encounter Provider: RADHA Li  Place of Service: [facility] CARDIOLOGY  Referring Provider: Jac Neal MD  Patient Care Team:  Provider, No Known as PCP - General      Chief complaint: Anasarca    Reason for Consult: Hypertension/ 4.2 second pause    History of Present Illness:    Mr Packer is a 46 year old patient with a history of hypertension who was admitted on 2021 with bilateral LE edema and SOA.  He was very hypertensive on arrival with B/P 209/143 and EKG showing sinus tachycardia. His BNP was elevated at 1627 and creatinine elevated at 1.66.  CXR showed cardiomegaly with vascular congestion. He was negative PE despite elevated D-dimer.     He was started on beta blocker, clonidine, and amlodipine along with IV diuretics. His blood pressure has improved since that time but he still is requiring PRN hydralazine.     ECHO was done which showed EF 56% with normal LV systolic function, moderate to severe concentric hypertrophy, mild lateral wall hypokinesis, RV cavity mildly dilated, mild MR and mild TR    He took antihypertensive medications in the past but quit them a couple years ago and has not seen a physician in a couple years.  Over the last couple of days he noticed some increase in edema.  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  He is got swelling in his legs abdomen and he said he feels very bloated.  He denies any pain in the chest tightness or pressure he does not have any complaints of palpitations, syncope or near syncope.    Yesterday he had a 4.2 second pause at 0200.  It appears that he had some second-degree type II heart block followed by a junctional rhythm.  He was asymptomatic. His electrolytes are normal.  His girlfriend is in the room with him who  states he does snore at night and appears to have periods of apnea.      Telemetry      Previous Cardiac Testing   ECHO 12/16/2021  · Estimated left ventricular EF = 56% Estimated left ventricular EF was in agreement with the calculated left ventricular EF. Left ventricular systolic function is normal.  · Left ventricular wall thickness is consistent with moderate to severe concentric hypertrophy.  · Mild lateral wall hypokinesis  · Left ventricular diastolic function is consistent with (grade I) impaired relaxation.  · The right ventricular cavity is mildly dilated.  · Estimated right ventricular systolic pressure from tricuspid regurgitation is normal (<35 mmHg).  · No aortic valve regurgitation or stenosis is present.  · Mild mitral valve regurgitation is present.  · Mild tricuspid valve regurgitation is present.         Past Medical History:   Diagnosis Date   • Hypertension        Past Surgical History:   Procedure Laterality Date   • TOE SURGERY           Prior to Admission medications    Not on File       No Known Allergies    Social History     Socioeconomic History   • Marital status: Single   Tobacco Use   • Smoking status: Current Every Day Smoker     Types: Cigarettes   • Smokeless tobacco: Never Used   Vaping Use   • Vaping Use: Never used   Substance and Sexual Activity   • Alcohol use: Never   • Drug use: Defer   • Sexual activity: Defer       Family History   Problem Relation Age of Onset   • Diabetes Father        REVIEW OF SYSTEMS:        CONSTITUTIONAL:  No weight loss. No fever or chills. No weakness. No fatigue.                           HEENT:  Normocephalic. Symmetrical. Atraumatic.                               Eyes: No visual loss. No blurred vision. No double vision. No icterus.                               Ears: No hearing loss. No drainage.                              Nose:  No sneezing. No congestion. No runny nose. No sore throat.           CARDIAC:         No chest pain,  "palpitations, syncope or near syncope. edema            RESPIRATORY:  + shortness of breath. No hemoptysis. No cough or sputum production. No wheezes.   GASTROINTESTINAL:         No anorexia. No nausea. No vomiting. No diarrhea. No abdominal pain. No distention. No bleeding from rectum. No melena.         GENITOURINARY:  No burning on urination. No hematuria. No frequency.  MUSCULOSKELETAL: No myalgia. No stiffness. No gait disturbances.                              SKIN:  No rash. No itching. No pallor.           NEUROLOGICAL: No headache. No dizziness. No syncope. No paralysis. No ataxia. No numbness. No tingling. No change in bowel or bladder control. No speech difficulty.            HEMATOLOGIC: No bleeding. No bruising.                PSYCHIATRIC: No depression. No anxiety. No confusion. No sleep disturbances. No hallucinations.       ENDOCRINOLOGIC: No diaphoresis. No heat or cold intolerance. No polyuria or polydipsia.       Objective:     Vitals:    12/17/21 0646 12/17/21 0658 12/17/21 0900 12/17/21 1015   BP: 170/94 141/75  165/94   BP Location: Left arm Left arm  Left arm   Patient Position: Lying Lying  Sitting   Pulse: 88 90     Resp:  18     Temp:  98.6 °F (37 °C)     TempSrc:  Oral     SpO2:  99%     Weight:   (!) 173 kg (381 lb 9.6 oz)    Height:   190.5 cm (75\")      Body mass index is 47.7 kg/m².    Physical Exam:   General Appearance:    Awake alert and oriented in no acute distress.   Color:  Skin:  Neuro:  HEENT:    Lungs:     Pink  Warm and dry  No focal, motor or sensory deficits  Neck supple, pupils equal, round and reactive. No JVD, No Bruit  Clear to auscultation,respirations regular, even and                  unlabored    Heart:    Regular rate and rhythm, S1 and S2, no murmur, no gallop, no rub. 1+ edema, DP/PT pulses are 2+   Chest Wall:    No abnormalities observed   Abdomen:     Normal bowel sounds, no masses, no organomegaly, soft        non-tender, non-distended, no guarding, no " ascites noted   Extremities:   Moves all extremities well, no edema, no cyanosis, no redness       Lab Review:     Results from last 7 days   Lab Units 12/17/21  0830 12/16/21  0652 12/16/21  0652   SODIUM mmol/L 143   < > 140   POTASSIUM mmol/L 3.4*   < > 3.6   CHLORIDE mmol/L 110*   < > 108*   CO2 mmol/L 27.6   < > 26.0   BUN mg/dL 11   < > 12   CREATININE mg/dL 1.49*   < > 1.66*   CALCIUM mg/dL 7.7*   < > 7.7*   BILIRUBIN mg/dL  --   --  0.2   ALK PHOS U/L  --   --  80   ALT (SGPT) U/L  --   --  11   AST (SGOT) U/L  --   --  10   GLUCOSE mg/dL 93   < > 94    < > = values in this interval not displayed.     Results from last 7 days   Lab Units 12/14/21  2200   TROPONIN T ng/mL <0.010     Results from last 7 days   Lab Units 12/17/21  0830   WBC 10*3/mm3 6.41   HEMOGLOBIN g/dL 11.3*   HEMATOCRIT % 35.4*   PLATELETS 10*3/mm3 287         Results from last 7 days   Lab Units 12/17/21  0830   MAGNESIUM mg/dL 1.9     Results from last 7 days   Lab Units 12/16/21  0651   CHOLESTEROL mg/dL 296*   TRIGLYCERIDES mg/dL 191*   HDL CHOL mg/dL 50   LDL CHOL mg/dL 209*              EKG this admission      No Previous EKG for comparison    I personally viewed and interpreted the patient's EKG/Telemetry data.        Assessment and Plan:       1.  Anasarca  2.  Severe LVH  3.  Acute diastolic heart failure  4.  Uncontrolled hypertension  5.  Second-degree type II heart block with 4.2-second pause followed by junctional rhythm while sleeping  6.  Most likely obstructive sleep apnea  7.  Acute kidney injury    Echocardiogram reviewed EF normal he has got some mild aortic insufficiency he is got also has moderate to severe LVH.  I think his diastolic dysfunction, kidney disease and uncontrolled blood pressures are all contributing to his fluid retention nephrology is following and they have him on IV Bumex.  I Sara stop his carvedilol and increase his amlodipine to 10 mg a day.  I think he needs to stay in the hospital for another  day or 2 to get tuned up.    Gloria Treviño, APRN  12/17/21  10:28 EST

## 2021-12-17 NOTE — CONSULTS
Electrophysiology Hospital Consult            Patient Name: Alexis Packer  Age/Sex: 46 y.o. male  : 1975  MRN: [id number]    Date of Admission: 2021  Date of Encounter Visit: 21  Encounter Provider: RADHA Herrera  Referring Provider: Jac Neal MD  Place of Service: [facility] CARDIOLOGY  Patient Care Team:  Provider, No Known as PCP - General      Subjective:   EP Consultation for: 4 second pause    Chief Complaint: Anasarca    History of Present Illness:  Alexis Packer is a 46 y.o. male with hypertension admitted on  with LE edema and dyspnea.     Was on meds for HTN in the past but was not taking them and hasn't for several years and has not seen a physician.     Yesterday afternoon he had a 4.2 second pause, asymptomatic, EP has been ask to see.     He denies chest pain, he is responding well to diuretics. He has never had syncope and no dizziness.     Past Medical History:  Past Medical History:   Diagnosis Date   • Hypertension        Past Surgical History:   Procedure Laterality Date   • TOE SURGERY         Home Medications:   No medications prior to admission.       Allergies:  No Known Allergies    Past Social History:  Social History     Socioeconomic History   • Marital status: Single   Tobacco Use   • Smoking status: Current Every Day Smoker     Types: Cigarettes   • Smokeless tobacco: Never Used   Vaping Use   • Vaping Use: Never used   Substance and Sexual Activity   • Alcohol use: Never   • Drug use: Defer   • Sexual activity: Defer       Past Family History:  Family History   Problem Relation Age of Onset   • Diabetes Father        Review of Systems: All systems reviewed. Pertinent positives identified in HPI. All other systems are negative.     14 point ROS was performed and is negative except as outlined in HPI.     Objective:     Objective:  Vital Signs (last 24 hours)        0700   0659  0700   1156   Most  Recent      Temp (°F) 98.3 -  98.8       98.6 (37) 12/17 0658    Heart Rate 82 -  99       90 12/17 0658    Resp   18       18 12/17 0658    /75 -  178/110      165/94     165/94 12/17 1015    SpO2 (%) 99 -  100       99 12/17 0658        Temp:  [98.3 °F (36.8 °C)-98.8 °F (37.1 °C)] 98.6 °F (37 °C)  Heart Rate:  [82-96] 90  Resp:  [18] 18  BP: (141-178)/() 165/94  Body mass index is 47.7 kg/m².        Physical Exam:     General Appearance: No acute distress, obese.   Eyes: Conjunctiva and lids: No erythema, swelling, or discharge. Sclera non-icteric.   HENT: Atraumatic, normocephalic. External eyes, ears, and nose normal.   Respiratory: No signs of respiratory distress. Respiration rhythm and depth normal.   Clear to auscultation. No rales, crackles, rhonchi, or wheezing auscultated.   Cardiovascular:  Heart Rate and Rhythm: Normal, Heart Sounds: Normal S1 and S2. No S3 or S4 noted.  Murmurs: No murmurs noted. No rubs, thrills, or gallops.  Lower Extremities: LE edema noted.   Gastrointestinal:  Abdomen obese, non-tender.   Musculoskeletal: Normal movement of extremities  Skin: Warm and dry.   Psychiatric: Patient alert and oriented to person, place, and time. Speech and behavior appropriate. Normal mood and affect.    Labs:   Lab Review:     Results from last 7 days   Lab Units 12/17/21  0830 12/16/21  0652 12/16/21  0651 12/15/21  0534 12/14/21  1655   SODIUM mmol/L 143 140 140 145 144   POTASSIUM mmol/L 3.4* 3.6 3.3* 3.5 3.7   CHLORIDE mmol/L 110* 108* 107 112* 112*   CO2 mmol/L 27.6 26.0 26.6 26.3 24.7   BUN mg/dL 11 12 13 10 11   CREATININE mg/dL 1.49* 1.66* 1.38* 1.55* 1.66*   GLUCOSE mg/dL 93 94 97 90 89   CALCIUM mg/dL 7.7* 7.7* 7.9* 7.7* 7.9*   AST (SGOT) U/L  --  10 13 11 15   ALT (SGPT) U/L  --  11 10 11 12     Results from last 7 days   Lab Units 12/14/21  2200   TROPONIN T ng/mL <0.010     Results from last 7 days   Lab Units 12/17/21  0830   WBC 10*3/mm3 6.41   HEMOGLOBIN g/dL 11.3*    HEMATOCRIT % 35.4*   PLATELETS 10*3/mm3 287         Results from last 7 days   Lab Units 12/16/21  0651   CHOLESTEROL mg/dL 296*     Results from last 7 days   Lab Units 12/17/21  0830   MAGNESIUM mg/dL 1.9     Results from last 7 days   Lab Units 12/16/21  0651   CHOLESTEROL mg/dL 296*   TRIGLYCERIDES mg/dL 191*   HDL CHOL mg/dL 50   LDL CHOL mg/dL 209*     Results from last 7 days   Lab Units 12/14/21  2200   PROBNP pg/mL 1,627.0*         Imaging:     Interpretation Summary    · Estimated left ventricular EF = 56% Estimated left ventricular EF was in agreement with the calculated left ventricular EF. Left ventricular systolic function is normal.  · Left ventricular wall thickness is consistent with moderate to severe concentric hypertrophy.  · Mild lateral wall hypokinesis  · Left ventricular diastolic function is consistent with (grade I) impaired relaxation.  · The right ventricular cavity is mildly dilated.  · Estimated right ventricular systolic pressure from tricuspid regurgitation is normal (<35 mmHg).  · No aortic valve regurgitation or stenosis is present.  · Mild mitral valve regurgitation is present.  · Mild tricuspid valve regurgitation is present.     EKG/Tele:               I personally viewed and interpreted the patient's EKG/Telemetry tracings.    Assessment:       Anasarca    Hypoalbuminemia    Elevated serum creatinine    Tobacco abuse    Class 3 severe obesity in adult ([facility])    Dyspnea    Hypertensive urgency    CARROL (acute kidney injury) ([facility])    Elevated brain natriuretic peptide (BNP) level    Proteinuria    Hematuria    Anemia    Elevated d-dimer    Vitamin D deficiency        Plan:     Dr. Hill and I saw patient this afternoon.     Reviewed tele and EKG--- the pause is mostly secondary to sleep apnea. No indication for pacemaker.     He has not had any dizziness, near syncope or syncope. Consult placed ot sleep medicine to get him set up for evaluation.     Reviewed echo---had long  discussion with him regarding the importance of blood pressure control, weight loss, low sodium diet and regular exercise---discussed hypertensive heart & renal disease.     He says he is going to take care of himself.     He would probably be a great candidate for SGLT2, will defer to primary team.     EP will sign off.     Thank you for allowing me to participate in the care of Alexis Packer. Feel free to contact me directly with any further questions or concerns.    RADHA Herrera  [facility]  12/17/21  0554

## 2021-12-18 LAB
ANION GAP SERPL CALCULATED.3IONS-SCNC: 5 MMOL/L (ref 5–15)
BUN SERPL-MCNC: 11 MG/DL (ref 6–20)
BUN/CREAT SERPL: 8 (ref 7–25)
CALCIUM SPEC-SCNC: 8 MG/DL (ref 8.6–10.5)
CHLORIDE SERPL-SCNC: 106 MMOL/L (ref 98–107)
CO2 SERPL-SCNC: 29 MMOL/L (ref 22–29)
CREAT SERPL-MCNC: 1.37 MG/DL (ref 0.76–1.27)
GFR SERPL CREATININE-BSD FRML MDRD: 67 ML/MIN/1.73
GLUCOSE SERPL-MCNC: 94 MG/DL (ref 65–99)
POTASSIUM SERPL-SCNC: 3.2 MMOL/L (ref 3.5–5.2)
QT INTERVAL: 388 MS
SODIUM SERPL-SCNC: 140 MMOL/L (ref 136–145)

## 2021-12-18 PROCEDURE — 36415 COLL VENOUS BLD VENIPUNCTURE: CPT | Performed by: NURSE PRACTITIONER

## 2021-12-18 PROCEDURE — 99232 SBSQ HOSP IP/OBS MODERATE 35: CPT | Performed by: INTERNAL MEDICINE

## 2021-12-18 PROCEDURE — 25010000002 HYDRALAZINE PER 20 MG: Performed by: NURSE PRACTITIONER

## 2021-12-18 PROCEDURE — 80048 BASIC METABOLIC PNL TOTAL CA: CPT | Performed by: NURSE PRACTITIONER

## 2021-12-18 RX ORDER — CARVEDILOL 12.5 MG/1
12.5 TABLET ORAL 2 TIMES DAILY WITH MEALS
Status: DISCONTINUED | OUTPATIENT
Start: 2021-12-18 | End: 2021-12-19

## 2021-12-18 RX ADMIN — BUMETANIDE 2 MG: 0.25 INJECTION INTRAMUSCULAR; INTRAVENOUS at 20:15

## 2021-12-18 RX ADMIN — BUMETANIDE 2 MG: 0.25 INJECTION INTRAMUSCULAR; INTRAVENOUS at 05:59

## 2021-12-18 RX ADMIN — PRAVASTATIN SODIUM 20 MG: 20 TABLET ORAL at 20:15

## 2021-12-18 RX ADMIN — BUMETANIDE 2 MG: 0.25 INJECTION INTRAMUSCULAR; INTRAVENOUS at 12:41

## 2021-12-18 RX ADMIN — CARVEDILOL 12.5 MG: 12.5 TABLET, FILM COATED ORAL at 10:43

## 2021-12-18 RX ADMIN — HYDRALAZINE HYDROCHLORIDE 10 MG: 20 INJECTION INTRAMUSCULAR; INTRAVENOUS at 01:13

## 2021-12-18 RX ADMIN — FERROUS SULFATE TAB 325 MG (65 MG ELEMENTAL FE) 325 MG: 325 (65 FE) TAB at 17:51

## 2021-12-18 RX ADMIN — AMLODIPINE BESYLATE 10 MG: 10 TABLET ORAL at 08:48

## 2021-12-18 RX ADMIN — CARVEDILOL 12.5 MG: 12.5 TABLET, FILM COATED ORAL at 17:51

## 2021-12-18 RX ADMIN — EMPAGLIFLOZIN 10 MG: 10 TABLET, FILM COATED ORAL at 12:41

## 2021-12-18 RX ADMIN — FERROUS SULFATE TAB 325 MG (65 MG ELEMENTAL FE) 325 MG: 325 (65 FE) TAB at 08:48

## 2021-12-18 NOTE — PROGRESS NOTES
NEPHROLOGY PROGRESS NOTE    PATIENT IDENTIFICATION:   Name:  Alexis Packer      MRN:  [id number]     46 y.o.  male             Reason for visit: CARROL    SUBJECTIVE:   Seen and examined.  No new issues overnight.  Denied any shortness of air or chest pain.  Feeling much better.  OBJECTIVE:  Vitals:    12/17/21 2155 12/18/21 0033 12/18/21 0543 12/18/21 0718   BP:  164/88  162/98   BP Location:  Left arm  Left arm   Patient Position:  Lying  Lying   Pulse: 95 89  87   Resp:  19  18   Temp:  98.1 °F (36.7 °C)  97.9 °F (36.6 °C)   TempSrc:  Oral  Oral   SpO2: 98% 96%  97%   Weight:   (!) 168 kg (371 lb 3.2 oz)    Height:               Body mass index is 46.4 kg/m².    Intake/Output Summary (Last 24 hours) at 12/18/2021 0835  Last data filed at 12/18/2021 0543  Gross per 24 hour   Intake 480 ml   Output 4775 ml   Net -4295 ml         Exam:  GEN:  No distress, appears stated age  EYES:   Anicteric sclera  ENT:    External ears/nose normal, MM are moist  NECK:  No adenopathy, JVP none  LUNGS: Normal chest on inspection; not labored  CV:  Normal S1S2, without murmur  ABD:  Non-tender, non-distended, no hepatosplenomegaly, +BS  EXT:  No edema; no cyanosis; clubbing    Scheduled meds:  amLODIPine, 10 mg, Oral, Q24H  bumetanide, 2 mg, Intravenous, Q8H  cloNIDine, 1 patch, Transdermal, Weekly  ferrous sulfate, 325 mg, Oral, BID With Meals  pravastatin, 20 mg, Oral, Nightly  vitamin D, 50,000 Units, Oral, Q7 Days      IV meds:                           Data Review:    Results from last 7 days   Lab Units 12/18/21  0623 12/17/21  0830 12/16/21  0652 12/16/21  0651 12/16/21  0651   SODIUM mmol/L 140 141  143 140   < > 140   POTASSIUM mmol/L 3.2* 3.4*  3.4* 3.6   < > 3.3*   CHLORIDE mmol/L 106 107  110* 108*   < > 107   CO2 mmol/L 29.0 25.5  27.6 26.0   < > 26.6   BUN mg/dL 11 10  11 12   < > 13   CREATININE mg/dL 1.37* 1.51*  1.49* 1.66*   < > 1.38*   CALCIUM mg/dL 8.0* 7.8*  7.7* 7.7*   < > 7.9*   BILIRUBIN mg/dL  --   <0.2 0.2  --  0.2   ALK PHOS U/L  --  71 80  --  74   ALT (SGPT) U/L  --  11 11  --  10   AST (SGOT) U/L  --  15 10  --  13   GLUCOSE mg/dL 94 86  93 94   < > 97    < > = values in this interval not displayed.       Estimated Creatinine Clearance: 110.1 mL/min (A) (by C-G formula based on SCr of 1.37 mg/dL (H)).    Results from last 7 days   Lab Units 12/17/21  0830 12/16/21  0651   MAGNESIUM mg/dL 1.9 1.9       Results from last 7 days   Lab Units 12/17/21  0830 12/16/21  0651 12/15/21  0534 12/14/21  1655   WBC 10*3/mm3 6.41 8.17 6.98 7.70   HEMOGLOBIN g/dL 11.3* 11.4* 10.8* 11.5*   PLATELETS 10*3/mm3 287 290 279 284                   ASSESSMENT:     Anasarca    Hypoalbuminemia    Elevated serum creatinine    Tobacco abuse    Class 3 severe obesity in adult ([facility])    Dyspnea    Hypertensive urgency    CARROL (acute kidney injury) ([facility])    Elevated brain natriuretic peptide (BNP) level    Proteinuria    Hematuria    Anemia    Elevated d-dimer    Vitamin D deficiency           - Proteinuria: Etiology is not very clear. He takes excessive NSAID;s. Will likely need a kidney biopsy. High D Dimer with proteinuria makes us worried about DVT/PE but CTA is negative.    - CARROL on CKD : Likely due to intravascular depletion and severe proteinuria. Will monitor kidney function closely  - High D Dimer: Had CTA  That was negative  - HTN:: Continue clonidine and Amlodipine and Coreg  - hypokalemia: Will check renin and aldosterone pending  - Hypocalcemia: Elevated PTH and low vitamin D  - Hyperlipidemia  -Secondary hyperparathyroidism    PLAN:    Creatinine has improved down to 1.3 mg/dL .  GN work-up so far is unremarkable.  Mildly elevated kappa and lambda but ratio is normal.    Replete potassium   plan for biopsy on Monday  Start patient on Coreg 12.5 twice a  Continue to diurese   Surveillance labs    Marylu Landry MD  12/18/2021    08:35 EST

## 2021-12-18 NOTE — PROGRESS NOTES
Name: Alexis Packer ADMIT: 2021   : 1975  PCP: Provider, No Known    MRN: [id number] LOS: 2 days   AGE/SEX: 46 y.o. male  ROOM: [facility]     Subjective   Subjective    wanting to go home. states swelling improved and denies shortness of breath    Review of Systems   Constitutional: Negative.  Negative for chills and fever.   HENT: Negative.    Eyes: Negative.    Respiratory: Negative.  Negative for cough, chest tightness and shortness of breath.    Cardiovascular: Positive for leg swelling. Negative for chest pain.   Gastrointestinal: Negative.  Negative for abdominal pain, diarrhea, nausea and vomiting.   Endocrine: Negative.    Genitourinary: Negative.    Musculoskeletal: Negative.    Skin: Negative.    Allergic/Immunologic: Negative.    Neurological: Negative.    Hematological: Negative.    Psychiatric/Behavioral: Negative.       Objective   Objective   Vital Signs  Temp:  [97.4 °F (36.3 °C)-98.3 °F (36.8 °C)] 97.4 °F (36.3 °C)  Heart Rate:  [79-95] 79  Resp:  [18-19] 18  BP: (158-179)/() 160/92  SpO2:  [96 %-98 %] 97 %  on   ;   Device (Oxygen Therapy): room air  Body mass index is 46.4 kg/m².     Physical Exam  Vitals and nursing note reviewed.   Constitutional:       General: He is not in acute distress.     Appearance: He is obese. He is not ill-appearing, toxic-appearing or diaphoretic.   HENT:      Head: Normocephalic and atraumatic.      Right Ear: External ear normal.      Left Ear: External ear normal.      Nose: Nose normal.   Eyes:      General: No scleral icterus.        Right eye: No discharge.         Left eye: No discharge.      Extraocular Movements: Extraocular movements intact.      Conjunctiva/sclera: Conjunctivae normal.   Cardiovascular:      Rate and Rhythm: Normal rate and regular rhythm.      Pulses: Normal pulses.      Heart sounds: Normal heart sounds.   Pulmonary:      Effort: Pulmonary effort is normal. No respiratory distress.      Breath sounds: Normal breath  sounds. No wheezing or rales.   Abdominal:      General: Bowel sounds are normal. There is no distension.      Palpations: Abdomen is soft.      Tenderness: There is no abdominal tenderness.   Musculoskeletal:         General: Swelling present. Normal range of motion.      Cervical back: Neck supple. No rigidity.      Right lower le+ Edema present.      Left lower le+ Edema present.   Lymphadenopathy:      Cervical: No cervical adenopathy.   Skin:     General: Skin is warm and dry.      Coloration: Skin is not jaundiced.   Neurological:      General: No focal deficit present.      Mental Status: He is alert and oriented to person, place, and time. Mental status is at baseline.   Psychiatric:         Mood and Affect: Mood normal.         Behavior: Behavior normal.         Thought Content: Thought content normal.       Results Review     I reviewed the patient's new clinical results.  Results from last 7 days   Lab Units 21  0830 21  0651 12/15/21  0534 21  1655   WBC 10*3/mm3 6.41 8.17 6.98 7.70   HEMOGLOBIN g/dL 11.3* 11.4* 10.8* 11.5*   PLATELETS 10*3/mm3 287 290 279 284     Results from last 7 days   Lab Units 21  0623 21  0830 21  0652 21  0651   SODIUM mmol/L 140 141  143 140 140   POTASSIUM mmol/L 3.2* 3.4*  3.4* 3.6 3.3*   CHLORIDE mmol/L 106 107  110* 108* 107   CO2 mmol/L 29.0 25.5  27.6 26.0 26.6   BUN mg/dL 11 10  11 12 13   CREATININE mg/dL 1.37* 1.51*  1.49* 1.66* 1.38*   GLUCOSE mg/dL 94 86  93 94 97   EGFR IF AFRICN AM mL/min/1.73 67 60*  61 54* 67     Results from last 7 days   Lab Units 21  0830 21  0652 21  0651 12/15/21  0534   ALBUMIN g/dL 1.80* 2.00* 1.60* 1.60*   BILIRUBIN mg/dL <0.2 0.2 0.2 0.3   ALK PHOS U/L 71 80 74 73   AST (SGOT) U/L 15 10 13 11   ALT (SGPT) U/L 11 11 10 11     Results from last 7 days   Lab Units 21  0623 21  0830 21  0652 21  0651 12/15/21  0534 12/15/21  0534   CALCIUM  mg/dL 8.0* 7.8*  7.7* 7.7* 7.9*   < > 7.7*   ALBUMIN g/dL  --  1.80* 2.00* 1.60*  --  1.60*   MAGNESIUM mg/dL  --  1.9  --  1.9  --   --     < > = values in this interval not displayed.       COVID19   Date Value Ref Range Status   12/14/2021 Not Detected Not Detected - Ref. Range Final     Scheduled Meds  amLODIPine, 10 mg, Oral, Q24H  bumetanide, 2 mg, Intravenous, Q8H  carvedilol, 12.5 mg, Oral, BID With Meals  cloNIDine, 1 patch, Transdermal, Weekly  empagliflozin, 10 mg, Oral, Daily  ferrous sulfate, 325 mg, Oral, BID With Meals  pravastatin, 20 mg, Oral, Nightly  vitamin D, 50,000 Units, Oral, Q7 Days    Continuous Infusions   PRN Meds  •  acetaminophen  •  aluminum-magnesium hydroxide-simethicone  •  hydrALAZINE  •  nitroglycerin  •  ondansetron **OR** ondansetron  •  sodium chloride      Diet  Diet Regular; Cardiac        Intake/Output Summary (Last 24 hours) at 12/18/2021 1428  Last data filed at 12/18/2021 1345  Gross per 24 hour   Intake 600 ml   Output 3080 ml   Net -2480 ml         Assessment/Plan     Active Hospital Problems    Diagnosis  POA   • **Anasarca [R60.1]  Yes   • Vitamin D deficiency [E55.9]  Yes   • Hypoalbuminemia [E88.09]  Yes   • Elevated serum creatinine [R79.89]  Yes   • Tobacco abuse [Z72.0]  Yes   • Class 3 severe obesity in adult (HCC) [E66.01]  Yes   • Dyspnea [R06.00]  Yes   • Hypertensive urgency [I16.0]  Yes   • CARROL (acute kidney injury) ([facility]) [N17.9]  Yes   • Elevated brain natriuretic peptide (BNP) level [R79.89]  Yes   • Proteinuria [R80.9]  Yes   • Hematuria [R31.9]  Yes   • Anemia [D64.9]  Yes   • Elevated d-dimer [R79.89]  Yes      Resolved Hospital Problems   No resolved problems to display.       46 y.o. male admitted with Anasarca.    Workup here concerning for renal etiology of his anasarca: proteinuria, hematuria, elevated Cr, hypoalbuminemia, anemia, hypertensive urgency. EKG, Trop, CTA chest, A1c all reassuring.     Appreciate nephrology  Continue IV Bumex, Cr  stable/improved  Renal U/S okay, renal artery duplex okay  Renal biopsy Monday vs outpatient  Strict I/Os and daily weights    Iron studies inconclusive, hemoccult negative, Hgb stable    HTN on Coreg, hydralazine, clonidine, amlodipine    Cardiology feels pause due to untreated FRANCISCO JAVIER. Pulmonology following. Will need outpatient sleep study.    Vitamin D oral replacement    DDimer elevated, not specific. CTA neg for PE and venous duplex BLEs neg for DVT    · SCDs for DVT prophylaxis.  · Full code.  · Discussed with patient and nursing staff and Dr. Landry  · Anticipate discharge home with family, timing yet to be determined. after biopsy if inpatient or biopsy outpatient    Jac Neal MD  [facility]  12/18/21  14:28 EST

## 2021-12-18 NOTE — PROGRESS NOTES
"    Patient Name: Alexis Packer  :1975  46 y.o.      Patient Care Team:  Provider, No Known as PCP - General    Chief Complaint: SOB    Interval History: denies CP, sleeping, goes right back to sleep       Objective   Vital Signs  Temp:  [97.9 °F (36.6 °C)-98.3 °F (36.8 °C)] 97.9 °F (36.6 °C)  Heart Rate:  [85-95] 87  Resp:  [16-19] 18  BP: (158-179)/() 162/98    Intake/Output Summary (Last 24 hours) at 2021 0944  Last data filed at 2021 0543  Gross per 24 hour   Intake 480 ml   Output 4775 ml   Net -4295 ml     Flowsheet Rows      First Filed Value   Admission Height 190.5 cm (75\") Documented at 2021   Admission Weight 181 kg (400 lb) Documented at 2021          Physical Exam:   General Appearance:    comfortable, in no acute distress   Lungs:     Clear to auscultation.  Normal respiratory effort and rate.      Heart:    Regular rhythm and normal rate, normal S1 and S2, no murmurs, gallops or rubs.     Chest Wall:    No abnormalities observed   Abdomen:     Soft, nontender, positive bowel sounds.     Extremities:   no cyanosis, clubbing or edema.  No marked joint deformities.        Results Review:    Results from last 7 days   Lab Units 21  0623   SODIUM mmol/L 140   POTASSIUM mmol/L 3.2*   CHLORIDE mmol/L 106   CO2 mmol/L 29.0   BUN mg/dL 11   CREATININE mg/dL 1.37*   GLUCOSE mg/dL 94   CALCIUM mg/dL 8.0*     Results from last 7 days   Lab Units 21  2200   TROPONIN T ng/mL <0.010     Results from last 7 days   Lab Units 21  0830   WBC 10*3/mm3 6.41   HEMOGLOBIN g/dL 11.3*   HEMATOCRIT % 35.4*   PLATELETS 10*3/mm3 287         Results from last 7 days   Lab Units 21  0830   MAGNESIUM mg/dL 1.9     Results from last 7 days   Lab Units 21  0651   CHOLESTEROL mg/dL 296*   TRIGLYCERIDES mg/dL 191*   HDL CHOL mg/dL 50   LDL CHOL mg/dL 209*               Medication Review:   amLODIPine, 10 mg, Oral, Q24H  bumetanide, 2 mg, Intravenous, " Q8H  carvedilol, 12.5 mg, Oral, BID With Meals  cloNIDine, 1 patch, Transdermal, Weekly  ferrous sulfate, 325 mg, Oral, BID With Meals  pravastatin, 20 mg, Oral, Nightly  vitamin D, 50,000 Units, Oral, Q7 Days              Assessment/Plan     Active Hospital Problems    Diagnosis  POA   • **Anasarca [R60.1]  Yes   • Vitamin D deficiency [E55.9]  Yes   • Hypoalbuminemia [E88.09]  Yes   • Elevated serum creatinine [R79.89]  Yes   • Tobacco abuse [Z72.0]  Yes   • Class 3 severe obesity in adult (HCC) [E66.01]  Yes   • Dyspnea [R06.00]  Yes   • Hypertensive urgency [I16.0]  Yes   • CARROL (acute kidney injury) (HCC) [N17.9]  Yes   • Elevated brain natriuretic peptide (BNP) level [R79.89]  Yes   • Proteinuria [R80.9]  Yes   • Hematuria [R31.9]  Yes   • Anemia [D64.9]  Yes   • Elevated d-dimer [R79.89]  Yes      Resolved Hospital Problems   No resolved problems to display.     1.  Anasarca  2.  Severe LVH  3.  Acute diastolic heart failure, good candidate for Jardiance, will initiate  4.  Uncontrolled hypertension  5.  Second-degree type II heart block with 4.2-second pause followed by junctional rhythm while sleeping-felt secondary to obstructive sleep apnea, seen by Dr. Hill who did not feel the patient needed a pacemaker.  Now started this morning on carvedilol 12.5 mg by nephrology, we will follow his heart rate response  6.  Most likely obstructive sleep apnea which could be contributing to the pause that was seen while sleeping, pulmonary following and evaluating, checking nocturnal oxygen levels and planning an outpatient sleep study  7.  Acute kidney injury     .  Dmitry Kent III, MD  [facility]  12/18/21  09:44 EST

## 2021-12-19 LAB
ANION GAP SERPL CALCULATED.3IONS-SCNC: 7.5 MMOL/L (ref 5–15)
BUN SERPL-MCNC: 12 MG/DL (ref 6–20)
BUN/CREAT SERPL: 8.5 (ref 7–25)
C-ANCA TITR SER IF: NORMAL TITER
CALCIUM SPEC-SCNC: 8.3 MG/DL (ref 8.6–10.5)
CHLORIDE SERPL-SCNC: 100 MMOL/L (ref 98–107)
CO2 SERPL-SCNC: 30.5 MMOL/L (ref 22–29)
CREAT SERPL-MCNC: 1.41 MG/DL (ref 0.76–1.27)
GFR SERPL CREATININE-BSD FRML MDRD: 65 ML/MIN/1.73
GLUCOSE SERPL-MCNC: 86 MG/DL (ref 65–99)
MYELOPEROXIDASE AB SER IA-ACNC: <9 U/ML (ref 0–9)
P-ANCA ATYPICAL TITR SER IF: NORMAL TITER
P-ANCA TITR SER IF: NORMAL TITER
POTASSIUM SERPL-SCNC: 3.3 MMOL/L (ref 3.5–5.2)
PROTEINASE3 AB SER IA-ACNC: <3.5 U/ML (ref 0–3.5)
SODIUM SERPL-SCNC: 138 MMOL/L (ref 136–145)

## 2021-12-19 PROCEDURE — 80048 BASIC METABOLIC PNL TOTAL CA: CPT | Performed by: NURSE PRACTITIONER

## 2021-12-19 PROCEDURE — 36415 COLL VENOUS BLD VENIPUNCTURE: CPT | Performed by: NURSE PRACTITIONER

## 2021-12-19 PROCEDURE — 99232 SBSQ HOSP IP/OBS MODERATE 35: CPT | Performed by: INTERNAL MEDICINE

## 2021-12-19 RX ORDER — POTASSIUM CHLORIDE 750 MG/1
60 TABLET, FILM COATED, EXTENDED RELEASE ORAL ONCE
Status: COMPLETED | OUTPATIENT
Start: 2021-12-19 | End: 2021-12-19

## 2021-12-19 RX ORDER — CARVEDILOL 25 MG/1
25 TABLET ORAL 2 TIMES DAILY WITH MEALS
Status: DISCONTINUED | OUTPATIENT
Start: 2021-12-19 | End: 2021-12-20 | Stop reason: HOSPADM

## 2021-12-19 RX ORDER — TORSEMIDE 100 MG/1
100 TABLET ORAL DAILY
Status: DISCONTINUED | OUTPATIENT
Start: 2021-12-19 | End: 2021-12-20

## 2021-12-19 RX ADMIN — BUMETANIDE 2 MG: 0.25 INJECTION INTRAMUSCULAR; INTRAVENOUS at 05:21

## 2021-12-19 RX ADMIN — FERROUS SULFATE TAB 325 MG (65 MG ELEMENTAL FE) 325 MG: 325 (65 FE) TAB at 08:32

## 2021-12-19 RX ADMIN — PRAVASTATIN SODIUM 20 MG: 20 TABLET ORAL at 21:52

## 2021-12-19 RX ADMIN — CARVEDILOL 25 MG: 25 TABLET, FILM COATED ORAL at 17:06

## 2021-12-19 RX ADMIN — AMLODIPINE BESYLATE 10 MG: 10 TABLET ORAL at 08:32

## 2021-12-19 RX ADMIN — CARVEDILOL 12.5 MG: 12.5 TABLET, FILM COATED ORAL at 08:32

## 2021-12-19 RX ADMIN — POTASSIUM CHLORIDE 60 MEQ: 10 TABLET, EXTENDED RELEASE ORAL at 17:05

## 2021-12-19 RX ADMIN — TORSEMIDE 100 MG: 100 TABLET ORAL at 17:05

## 2021-12-19 RX ADMIN — EMPAGLIFLOZIN 10 MG: 10 TABLET, FILM COATED ORAL at 08:32

## 2021-12-19 RX ADMIN — FERROUS SULFATE TAB 325 MG (65 MG ELEMENTAL FE) 325 MG: 325 (65 FE) TAB at 17:06

## 2021-12-19 NOTE — PROGRESS NOTES
NEPHROLOGY PROGRESS NOTE    PATIENT IDENTIFICATION:   Name:  Alexis Packer      MRN:  [id number]     46 y.o.  male             Reason for visit: CARROL    SUBJECTIVE:   Seen and examined.  No new issues overnight.  Denied any shortness of air or chest pain.  Feeling much better.  OBJECTIVE:  Vitals:    12/18/21 2318 12/19/21 0532 12/19/21 0700 12/19/21 0734   BP: 162/99  (!) 157/110 180/90   BP Location: Left arm  Left arm    Patient Position: Lying  Lying    Pulse: 79  75    Resp: 18  18    Temp: 97.5 °F (36.4 °C)  97.9 °F (36.6 °C)    TempSrc: Oral  Oral    SpO2: 95%  94%    Weight:  (!) 162 kg (357 lb 12.8 oz)     Height:               Body mass index is 44.72 kg/m².    Intake/Output Summary (Last 24 hours) at 12/19/2021 0958  Last data filed at 12/19/2021 0509  Gross per 24 hour   Intake 1200 ml   Output 5280 ml   Net -4080 ml         Exam:  GEN:  No distress, appears stated age  EYES:   Anicteric sclera  ENT:    External ears/nose normal, MM are moist  NECK:  No adenopathy, JVP none  LUNGS: Normal chest on inspection; not labored  CV:  Normal S1S2, without murmur  ABD:  Non-tender, non-distended, no hepatosplenomegaly, +BS  EXT:  No edema; no cyanosis; clubbing    Scheduled meds:  amLODIPine, 10 mg, Oral, Q24H  carvedilol, 12.5 mg, Oral, BID With Meals  cloNIDine, 1 patch, Transdermal, Weekly  empagliflozin, 10 mg, Oral, Daily  ferrous sulfate, 325 mg, Oral, BID With Meals  pravastatin, 20 mg, Oral, Nightly  torsemide, 100 mg, Oral, Daily  vitamin D, 50,000 Units, Oral, Q7 Days      IV meds:                           Data Review:    Results from last 7 days   Lab Units 12/19/21  0707 12/18/21  0623 12/17/21  0830 12/16/21  0652 12/16/21  0652 12/16/21  0651 12/16/21  0651   SODIUM mmol/L 138 140 141  143   < > 140   < > 140   POTASSIUM mmol/L 3.3* 3.2* 3.4*  3.4*   < > 3.6   < > 3.3*   CHLORIDE mmol/L 100 106 107  110*   < > 108*   < > 107   CO2 mmol/L 30.5* 29.0 25.5  27.6   < > 26.0   < > 26.6   BUN mg/dL  12 11 10  11   < > 12   < > 13   CREATININE mg/dL 1.41* 1.37* 1.51*  1.49*   < > 1.66*   < > 1.38*   CALCIUM mg/dL 8.3* 8.0* 7.8*  7.7*   < > 7.7*   < > 7.9*   BILIRUBIN mg/dL  --   --  <0.2  --  0.2  --  0.2   ALK PHOS U/L  --   --  71  --  80  --  74   ALT (SGPT) U/L  --   --  11  --  11  --  10   AST (SGOT) U/L  --   --  15  --  10  --  13   GLUCOSE mg/dL 86 94 86  93   < > 94   < > 97    < > = values in this interval not displayed.       Estimated Creatinine Clearance: 105.1 mL/min (A) (by C-G formula based on SCr of 1.41 mg/dL (H)).    Results from last 7 days   Lab Units 12/17/21  0830 12/16/21  0651   MAGNESIUM mg/dL 1.9 1.9       Results from last 7 days   Lab Units 12/17/21  0830 12/16/21  0651 12/15/21  0534 12/14/21  1655   WBC 10*3/mm3 6.41 8.17 6.98 7.70   HEMOGLOBIN g/dL 11.3* 11.4* 10.8* 11.5*   PLATELETS 10*3/mm3 287 290 279 284                   ASSESSMENT:     Anasarca    Hypoalbuminemia    Elevated serum creatinine    Tobacco abuse    Class 3 severe obesity in adult ([facility])    Dyspnea    Hypertensive urgency    CARROL (acute kidney injury) ([facility])    Elevated brain natriuretic peptide (BNP) level    Proteinuria    Hematuria    Anemia    Elevated d-dimer    Vitamin D deficiency           - Proteinuria: Etiology is not very clear. He takes excessive NSAID;s. Will likely need a kidney biopsy. High D Dimer with proteinuria makes us worried about DVT/PE but CTA is negative.    - CARROL on CKD : Likely due to intravascular depletion and severe proteinuria. Will monitor kidney function closely  - High D Dimer: Had CTA  That was negative  - HTN:: Continue clonidine and Amlodipine and Coreg  - hypokalemia: Will check renin and aldosterone pending  - Hypocalcemia: Elevated PTH and low vitamin D  - Hyperlipidemia  -Secondary hyperparathyroidism    PLAN:    Creatinine has improved down to 1.4 mg/dL .  GN work-up so far is unremarkable.  Mildly elevated kappa and lambda but ratio is normal.    Replete potassium    plan for biopsy on Monday  Increase Coreg to 25 mg p.o. twice a day  Continue to diurese but switch to torsemide  Surveillance labs    Marylu Landry MD  12/19/2021    09:58 EST

## 2021-12-19 NOTE — H&P (VIEW-ONLY)
NEPHROLOGY PROGRESS NOTE    PATIENT IDENTIFICATION:   Name:  Alexis Packer      MRN:  [id number]     46 y.o.  male             Reason for visit: CARROL    SUBJECTIVE:   Seen and examined.  No new issues overnight.  Denied any shortness of air or chest pain.  Feeling much better.  OBJECTIVE:  Vitals:    12/18/21 2318 12/19/21 0532 12/19/21 0700 12/19/21 0734   BP: 162/99  (!) 157/110 180/90   BP Location: Left arm  Left arm    Patient Position: Lying  Lying    Pulse: 79  75    Resp: 18  18    Temp: 97.5 °F (36.4 °C)  97.9 °F (36.6 °C)    TempSrc: Oral  Oral    SpO2: 95%  94%    Weight:  (!) 162 kg (357 lb 12.8 oz)     Height:               Body mass index is 44.72 kg/m².    Intake/Output Summary (Last 24 hours) at 12/19/2021 0958  Last data filed at 12/19/2021 0509  Gross per 24 hour   Intake 1200 ml   Output 5280 ml   Net -4080 ml         Exam:  GEN:  No distress, appears stated age  EYES:   Anicteric sclera  ENT:    External ears/nose normal, MM are moist  NECK:  No adenopathy, JVP none  LUNGS: Normal chest on inspection; not labored  CV:  Normal S1S2, without murmur  ABD:  Non-tender, non-distended, no hepatosplenomegaly, +BS  EXT:  No edema; no cyanosis; clubbing    Scheduled meds:  amLODIPine, 10 mg, Oral, Q24H  carvedilol, 12.5 mg, Oral, BID With Meals  cloNIDine, 1 patch, Transdermal, Weekly  empagliflozin, 10 mg, Oral, Daily  ferrous sulfate, 325 mg, Oral, BID With Meals  pravastatin, 20 mg, Oral, Nightly  torsemide, 100 mg, Oral, Daily  vitamin D, 50,000 Units, Oral, Q7 Days      IV meds:                           Data Review:    Results from last 7 days   Lab Units 12/19/21  0707 12/18/21  0623 12/17/21  0830 12/16/21  0652 12/16/21  0652 12/16/21  0651 12/16/21  0651   SODIUM mmol/L 138 140 141  143   < > 140   < > 140   POTASSIUM mmol/L 3.3* 3.2* 3.4*  3.4*   < > 3.6   < > 3.3*   CHLORIDE mmol/L 100 106 107  110*   < > 108*   < > 107   CO2 mmol/L 30.5* 29.0 25.5  27.6   < > 26.0   < > 26.6   BUN mg/dL  12 11 10  11   < > 12   < > 13   CREATININE mg/dL 1.41* 1.37* 1.51*  1.49*   < > 1.66*   < > 1.38*   CALCIUM mg/dL 8.3* 8.0* 7.8*  7.7*   < > 7.7*   < > 7.9*   BILIRUBIN mg/dL  --   --  <0.2  --  0.2  --  0.2   ALK PHOS U/L  --   --  71  --  80  --  74   ALT (SGPT) U/L  --   --  11  --  11  --  10   AST (SGOT) U/L  --   --  15  --  10  --  13   GLUCOSE mg/dL 86 94 86  93   < > 94   < > 97    < > = values in this interval not displayed.       Estimated Creatinine Clearance: 105.1 mL/min (A) (by C-G formula based on SCr of 1.41 mg/dL (H)).    Results from last 7 days   Lab Units 12/17/21  0830 12/16/21  0651   MAGNESIUM mg/dL 1.9 1.9       Results from last 7 days   Lab Units 12/17/21  0830 12/16/21  0651 12/15/21  0534 12/14/21  1655   WBC 10*3/mm3 6.41 8.17 6.98 7.70   HEMOGLOBIN g/dL 11.3* 11.4* 10.8* 11.5*   PLATELETS 10*3/mm3 287 290 279 284                   ASSESSMENT:     Anasarca    Hypoalbuminemia    Elevated serum creatinine    Tobacco abuse    Class 3 severe obesity in adult ([facility])    Dyspnea    Hypertensive urgency    CARROL (acute kidney injury) ([facility])    Elevated brain natriuretic peptide (BNP) level    Proteinuria    Hematuria    Anemia    Elevated d-dimer    Vitamin D deficiency           - Proteinuria: Etiology is not very clear. He takes excessive NSAID;s. Will likely need a kidney biopsy. High D Dimer with proteinuria makes us worried about DVT/PE but CTA is negative.    - CARROL on CKD : Likely due to intravascular depletion and severe proteinuria. Will monitor kidney function closely  - High D Dimer: Had CTA  That was negative  - HTN:: Continue clonidine and Amlodipine and Coreg  - hypokalemia: Will check renin and aldosterone pending  - Hypocalcemia: Elevated PTH and low vitamin D  - Hyperlipidemia  -Secondary hyperparathyroidism    PLAN:    Creatinine has improved down to 1.4 mg/dL .  GN work-up so far is unremarkable.  Mildly elevated kappa and lambda but ratio is normal.    Replete potassium    plan for biopsy on Monday  Increase Coreg to 25 mg p.o. twice a day  Continue to diurese but switch to torsemide  Surveillance labs    Marylu Landry MD  12/19/2021    09:58 EST

## 2021-12-19 NOTE — PLAN OF CARE
Goal Outcome Evaluation:  Plan of Care Reviewed With: patient        Progress: improving   Patient A&O x4, VSS, on RA. Patient up ad ric in room. Patient to have a renal biopsy tomorrow and then DC home. Dr. Xiong requesting renal biopsy paperwork, obtained and placed in chart since he was not on unit. Patient to be NPO at [place]. No other issues noted. PAUL

## 2021-12-19 NOTE — PROGRESS NOTES
Name: Alexis Packer ADMIT: 2021   : 1975  PCP: Provider, No Known    MRN: [id number] LOS: 3 days   AGE/SEX: 46 y.o. male  ROOM: [facility]     Subjective   Subjective    No new complaints.    Review of Systems   Constitutional: Negative.  Negative for chills and fever.   HENT: Negative.    Eyes: Negative.    Respiratory: Negative.  Negative for cough, chest tightness and shortness of breath.    Cardiovascular: Negative for chest pain.   Gastrointestinal: Negative.  Negative for abdominal pain, diarrhea, nausea and vomiting.   Endocrine: Negative.    Genitourinary: Negative.    Musculoskeletal: Negative.    Skin: Negative.    Allergic/Immunologic: Negative.    Neurological: Negative.    Hematological: Negative.    Psychiatric/Behavioral: Negative.       Objective   Objective   Vital Signs  Temp:  [97.2 °F (36.2 °C)-97.9 °F (36.6 °C)] 97.8 °F (36.6 °C)  Heart Rate:  [73-85] 73  Resp:  [18] 18  BP: (122-180)/() 122/76  SpO2:  [90 %-97 %] 90 %  on   ;   Device (Oxygen Therapy): room air  Body mass index is 44.72 kg/m².     Physical Exam  Vitals and nursing note reviewed.   Constitutional:       General: He is not in acute distress.     Appearance: He is obese. He is not ill-appearing, toxic-appearing or diaphoretic.   HENT:      Head: Normocephalic and atraumatic.      Right Ear: External ear normal.      Left Ear: External ear normal.      Nose: Nose normal.   Eyes:      General: No scleral icterus.        Right eye: No discharge.         Left eye: No discharge.      Extraocular Movements: Extraocular movements intact.      Conjunctiva/sclera: Conjunctivae normal.   Cardiovascular:      Rate and Rhythm: Normal rate and regular rhythm.      Pulses: Normal pulses.      Heart sounds: Normal heart sounds.   Pulmonary:      Effort: Pulmonary effort is normal. No respiratory distress.      Breath sounds: Normal breath sounds. No wheezing or rales.   Abdominal:      General: Bowel sounds are normal. There  is no distension.      Palpations: Abdomen is soft.      Tenderness: There is no abdominal tenderness.   Musculoskeletal:         General: Swelling present. Normal range of motion.      Cervical back: Neck supple. No rigidity.      Right lower le+ Edema present.      Left lower le+ Edema present.   Lymphadenopathy:      Cervical: No cervical adenopathy.   Skin:     General: Skin is warm and dry.      Coloration: Skin is not jaundiced.   Neurological:      General: No focal deficit present.      Mental Status: He is alert and oriented to person, place, and time. Mental status is at baseline.   Psychiatric:         Mood and Affect: Mood normal.         Behavior: Behavior normal.         Thought Content: Thought content normal.       Results Review     I reviewed the patient's new clinical results.  Results from last 7 days   Lab Units 21  0830 21  0651 12/15/21  0534 21  1655   WBC 10*3/mm3 6.41 8.17 6.98 7.70   HEMOGLOBIN g/dL 11.3* 11.4* 10.8* 11.5*   PLATELETS 10*3/mm3 287 290 279 284     Results from last 7 days   Lab Units 21  0707 21  0623 21  0830 21  0652   SODIUM mmol/L 138 140 141  143 140   POTASSIUM mmol/L 3.3* 3.2* 3.4*  3.4* 3.6   CHLORIDE mmol/L 100 106 107  110* 108*   CO2 mmol/L 30.5* 29.0 25.5  27.6 26.0   BUN mg/dL 12 11 10  11 12   CREATININE mg/dL 1.41* 1.37* 1.51*  1.49* 1.66*   GLUCOSE mg/dL 86 94 86  93 94   EGFR IF AFRICN AM mL/min/1.73 65 67 60*  61 54*     Results from last 7 days   Lab Units 21  0830 21  0652 21  0651 12/15/21  0534   ALBUMIN g/dL 1.80* 2.00* 1.60* 1.60*   BILIRUBIN mg/dL <0.2 0.2 0.2 0.3   ALK PHOS U/L 71 80 74 73   AST (SGOT) U/L 15 10 13 11   ALT (SGPT) U/L 11 11 10 11     Results from last 7 days   Lab Units 21  0707 21  0623 21  0830 21  0652 21  0651 21  0651 12/15/21  0534 12/15/21  0534   CALCIUM mg/dL 8.3* 8.0* 7.8*  7.7* 7.7*   < > 7.9*   < > 7.7*    ALBUMIN g/dL  --   --  1.80* 2.00*  --  1.60*  --  1.60*   MAGNESIUM mg/dL  --   --  1.9  --   --  1.9  --   --     < > = values in this interval not displayed.       COVID19   Date Value Ref Range Status   12/14/2021 Not Detected Not Detected - Ref. Range Final     Scheduled Meds  amLODIPine, 10 mg, Oral, Q24H  carvedilol, 25 mg, Oral, BID With Meals  cloNIDine, 1 patch, Transdermal, Weekly  empagliflozin, 10 mg, Oral, Daily  ferrous sulfate, 325 mg, Oral, BID With Meals  potassium chloride, 60 mEq, Oral, Once  pravastatin, 20 mg, Oral, Nightly  torsemide, 100 mg, Oral, Daily  vitamin D, 50,000 Units, Oral, Q7 Days    Continuous Infusions   PRN Meds  •  acetaminophen  •  aluminum-magnesium hydroxide-simethicone  •  hydrALAZINE  •  nitroglycerin  •  ondansetron **OR** ondansetron  •  sodium chloride      Diet  Diet Regular; Cardiac        Intake/Output Summary (Last 24 hours) at 12/19/2021 1418  Last data filed at 12/19/2021 1300  Gross per 24 hour   Intake 480 ml   Output 5630 ml   Net -5150 ml         Assessment/Plan     Active Hospital Problems    Diagnosis  POA   • **Anasarca [R60.1]  Yes   • Vitamin D deficiency [E55.9]  Yes   • Hypoalbuminemia [E88.09]  Yes   • Elevated serum creatinine [R79.89]  Yes   • Tobacco abuse [Z72.0]  Yes   • Class 3 severe obesity in adult ([facility]) [E66.01]  Yes   • Dyspnea [R06.00]  Yes   • Hypertensive urgency [I16.0]  Yes   • CARROL (acute kidney injury) ([facility]) [N17.9]  Yes   • Elevated brain natriuretic peptide (BNP) level [R79.89]  Yes   • Proteinuria [R80.9]  Yes   • Hematuria [R31.9]  Yes   • Anemia [D64.9]  Yes   • Elevated d-dimer [R79.89]  Yes      Resolved Hospital Problems   No resolved problems to display.       46 y.o. male admitted with Anasarca.    Workup here concerning for renal etiology of his anasarca: proteinuria, hematuria, elevated Cr, hypoalbuminemia, anemia, hypertensive urgency. EKG, Trop, CTA chest, A1c all reassuring.     Appreciate nephrology  Continue IV  Bumex, Cr stable/improved. Nephrology switching to torsemide  Renal U/S okay, renal artery duplex okay  Renal biopsy tomorrow  Strict I/Os and daily weights    Iron studies inconclusive, hemoccult negative, Hgb stable    HTN on Coreg, hydralazine, clonidine, amlodipine    Cardiology feels pause due to untreated FRANCISCO JAVIER. Will need outpatient sleep study.    Vitamin D oral replacement    DDimer elevated, not specific. CTA neg for PE and venous duplex BLEs neg for DVT    · SCDs for DVT prophylaxis.  · Full code.  · Discussed with patient   · Anticipate discharge home with family, timing yet to be determined. after biopsy maybe    Jac Neal MD  [facility]  12/19/21  14:18 EST

## 2021-12-19 NOTE — PROGRESS NOTES
"    Patient Name: Alexis Packer  :1975  46 y.o.      Patient Care Team:  Provider, No Known as PCP - General    Chief Complaint: SOB    Interval History: no CP       Objective   Vital Signs  Temp:  [97.2 °F (36.2 °C)-97.9 °F (36.6 °C)] 97.9 °F (36.6 °C)  Heart Rate:  [74-85] 75  Resp:  [18] 18  BP: (157-180)/() 180/90    Intake/Output Summary (Last 24 hours) at 2021 0943  Last data filed at 2021 0509  Gross per 24 hour   Intake 1200 ml   Output 5280 ml   Net -4080 ml     Flowsheet Rows      First Filed Value   Admission Height 190.5 cm (75\") Documented at 2021   Admission Weight 181 kg (400 lb) Documented at 2021          Physical Exam:   General Appearance:    comfortable, in no acute distress   Lungs:     Clear to auscultation.  Normal respiratory effort and rate.      Heart:    Regular rhythm and normal rate, normal S1 and S2, no murmurs, gallops or rubs.     Chest Wall:    No abnormalities observed   Abdomen:     Soft, nontender, positive bowel sounds.     Extremities:   no cyanosis, clubbing or edema.  No marked joint deformities.        Results Review:    Results from last 7 days   Lab Units 21  0707   SODIUM mmol/L 138   POTASSIUM mmol/L 3.3*   CHLORIDE mmol/L 100   CO2 mmol/L 30.5*   BUN mg/dL 12   CREATININE mg/dL 1.41*   GLUCOSE mg/dL 86   CALCIUM mg/dL 8.3*     Results from last 7 days   Lab Units 21  2200   TROPONIN T ng/mL <0.010     Results from last 7 days   Lab Units 21  0830   WBC 10*3/mm3 6.41   HEMOGLOBIN g/dL 11.3*   HEMATOCRIT % 35.4*   PLATELETS 10*3/mm3 287         Results from last 7 days   Lab Units 21  0830   MAGNESIUM mg/dL 1.9     Results from last 7 days   Lab Units 21  0651   CHOLESTEROL mg/dL 296*   TRIGLYCERIDES mg/dL 191*   HDL CHOL mg/dL 50   LDL CHOL mg/dL 209*               Medication Review:   amLODIPine, 10 mg, Oral, Q24H  carvedilol, 12.5 mg, Oral, BID With Meals  cloNIDine, 1 patch, Transdermal, " Weekly  empagliflozin, 10 mg, Oral, Daily  ferrous sulfate, 325 mg, Oral, BID With Meals  pravastatin, 20 mg, Oral, Nightly  torsemide, 100 mg, Oral, Daily  vitamin D, 50,000 Units, Oral, Q7 Days              Assessment/Plan     Active Hospital Problems    Diagnosis  POA   • **Anasarca [R60.1]  Yes   • Vitamin D deficiency [E55.9]  Yes   • Hypoalbuminemia [E88.09]  Yes   • Elevated serum creatinine [R79.89]  Yes   • Tobacco abuse [Z72.0]  Yes   • Class 3 severe obesity in adult ([facility]) [E66.01]  Yes   • Dyspnea [R06.00]  Yes   • Hypertensive urgency [I16.0]  Yes   • CARROL (acute kidney injury) ([facility]) [N17.9]  Yes   • Elevated brain natriuretic peptide (BNP) level [R79.89]  Yes   • Proteinuria [R80.9]  Yes   • Hematuria [R31.9]  Yes   • Anemia [D64.9]  Yes   • Elevated d-dimer [R79.89]  Yes      Resolved Hospital Problems   No resolved problems to display.     1.  Anasarca- has lost 64 lbs since admit  2.  Severe LVH  3.  Acute diastolic heart failure, good candidate for Jardiance, will initiate  4.  Uncontrolled hypertension, proteniuria, potential kidney biopsy by renal, meds per renal  5.  Second-degree type II heart block with 4.2-second pause followed by junctional rhythm while sleeping-felt secondary to obstructive sleep apnea, seen by Dr. Hill who did not feel the patient needed a pacemaker.  Now started yesterday morning on carvedilol 12.5 mg by nephrology, we will follow his heart rate response  6.  Most likely obstructive sleep apnea which could be contributing to the pause that was seen while sleeping, pulmonary following and evaluating, checked nocturnal oxygen levels and planning an outpatient sleep study  7.  Acute kidney injury    Plans for d/c tomorrow noted, will sign off, call if we can help in any way     .  Dmitry Kent III, MD  [facility]  12/19/21  09:43 EST

## 2021-12-20 ENCOUNTER — APPOINTMENT (OUTPATIENT)
Dept: CT IMAGING | Facility: HOSPITAL | Age: 46
End: 2021-12-20

## 2021-12-20 VITALS
OXYGEN SATURATION: 95 % | WEIGHT: 315 LBS | HEART RATE: 79 BPM | RESPIRATION RATE: 18 BRPM | DIASTOLIC BLOOD PRESSURE: 87 MMHG | BODY MASS INDEX: 39.17 KG/M2 | SYSTOLIC BLOOD PRESSURE: 135 MMHG | TEMPERATURE: 97.7 F | HEIGHT: 75 IN

## 2021-12-20 LAB
ALBUMIN SERPL ELPH-MCNC: 1.5 G/DL (ref 2.9–4.4)
ALBUMIN/GLOB SERPL: 0.6 {RATIO} (ref 0.7–1.7)
ALPHA1 GLOB SERPL ELPH-MCNC: 0.2 G/DL (ref 0–0.4)
ALPHA2 GLOB SERPL ELPH-MCNC: 0.7 G/DL (ref 0.4–1)
ANION GAP SERPL CALCULATED.3IONS-SCNC: 5 MMOL/L (ref 5–15)
B-GLOBULIN SERPL ELPH-MCNC: 1.1 G/DL (ref 0.7–1.3)
BUN SERPL-MCNC: 15 MG/DL (ref 6–20)
BUN/CREAT SERPL: 9.4 (ref 7–25)
CALCIUM SPEC-SCNC: 8 MG/DL (ref 8.6–10.5)
CHLORIDE SERPL-SCNC: 102 MMOL/L (ref 98–107)
CO2 SERPL-SCNC: 34 MMOL/L (ref 22–29)
CREAT SERPL-MCNC: 1.6 MG/DL (ref 0.76–1.27)
GAMMA GLOB SERPL ELPH-MCNC: 0.6 G/DL (ref 0.4–1.8)
GFR SERPL CREATININE-BSD FRML MDRD: 56 ML/MIN/1.73
GLOBULIN SER CALC-MCNC: 2.6 G/DL (ref 2.2–3.9)
GLUCOSE SERPL-MCNC: 91 MG/DL (ref 65–99)
IGA SERPL-MCNC: 322 MG/DL (ref 90–386)
IGG SERPL-MCNC: 633 MG/DL (ref 603–1613)
IGM SERPL-MCNC: 91 MG/DL (ref 20–172)
INR PPP: 1.1 (ref 0.8–1.2)
LABORATORY COMMENT REPORT: ABNORMAL
M PROTEIN SERPL ELPH-MCNC: ABNORMAL G/DL
POTASSIUM SERPL-SCNC: 3.7 MMOL/L (ref 3.5–5.2)
PROT PATTERN SERPL ELPH-IMP: ABNORMAL
PROT PATTERN SERPL IFE-IMP: NORMAL
PROT SERPL-MCNC: 4.1 G/DL (ref 6–8.5)
PROTHROMBIN TIME: 12.6 SECONDS (ref 12.8–15.2)
SODIUM SERPL-SCNC: 141 MMOL/L (ref 136–145)

## 2021-12-20 PROCEDURE — 83835 ASSAY OF METANEPHRINES: CPT | Performed by: INTERNAL MEDICINE

## 2021-12-20 PROCEDURE — 80048 BASIC METABOLIC PNL TOTAL CA: CPT | Performed by: NURSE PRACTITIONER

## 2021-12-20 PROCEDURE — 0 LIDOCAINE 1 % SOLUTION: Performed by: RADIOLOGY

## 2021-12-20 PROCEDURE — 85610 PROTHROMBIN TIME: CPT

## 2021-12-20 PROCEDURE — 77012 CT SCAN FOR NEEDLE BIOPSY: CPT

## 2021-12-20 PROCEDURE — 0TB03ZX EXCISION OF RIGHT KIDNEY, PERCUTANEOUS APPROACH, DIAGNOSTIC: ICD-10-PCS | Performed by: RADIOLOGY

## 2021-12-20 PROCEDURE — 36415 COLL VENOUS BLD VENIPUNCTURE: CPT | Performed by: NURSE PRACTITIONER

## 2021-12-20 PROCEDURE — 25010000002 MIDAZOLAM PER 1 MG: Performed by: RADIOLOGY

## 2021-12-20 PROCEDURE — 88300 SURGICAL PATH GROSS: CPT | Performed by: INTERNAL MEDICINE

## 2021-12-20 PROCEDURE — 25010000002 FENTANYL CITRATE (PF) 50 MCG/ML SOLUTION: Performed by: RADIOLOGY

## 2021-12-20 RX ORDER — FENTANYL CITRATE 50 UG/ML
INJECTION, SOLUTION INTRAMUSCULAR; INTRAVENOUS
Status: COMPLETED | OUTPATIENT
Start: 2021-12-20 | End: 2021-12-20

## 2021-12-20 RX ORDER — LANOLIN ALCOHOL/MO/W.PET/CERES
325 CREAM (GRAM) TOPICAL 2 TIMES DAILY WITH MEALS
Qty: 60 TABLET | Refills: 0 | Status: SHIPPED | OUTPATIENT
Start: 2021-12-20

## 2021-12-20 RX ORDER — PRAVASTATIN SODIUM 20 MG
20 TABLET ORAL NIGHTLY
Qty: 30 TABLET | Refills: 0 | Status: SHIPPED | OUTPATIENT
Start: 2021-12-20

## 2021-12-20 RX ORDER — ERGOCALCIFEROL 1.25 MG/1
50000 CAPSULE ORAL
Qty: 4 CAPSULE | Refills: 0 | Status: SHIPPED | OUTPATIENT
Start: 2021-12-24

## 2021-12-20 RX ORDER — TORSEMIDE 20 MG/1
50 TABLET ORAL DAILY
Qty: 75 TABLET | Refills: 0 | Status: SHIPPED | OUTPATIENT
Start: 2021-12-21

## 2021-12-20 RX ORDER — MIDAZOLAM HYDROCHLORIDE 1 MG/ML
INJECTION INTRAMUSCULAR; INTRAVENOUS
Status: COMPLETED | OUTPATIENT
Start: 2021-12-20 | End: 2021-12-20

## 2021-12-20 RX ORDER — AMLODIPINE BESYLATE 10 MG/1
10 TABLET ORAL
Qty: 30 TABLET | Refills: 0 | Status: SHIPPED | OUTPATIENT
Start: 2021-12-21

## 2021-12-20 RX ORDER — LIDOCAINE HYDROCHLORIDE 10 MG/ML
20 INJECTION, SOLUTION INFILTRATION; PERINEURAL ONCE
Status: COMPLETED | OUTPATIENT
Start: 2021-12-20 | End: 2021-12-20

## 2021-12-20 RX ORDER — CLONIDINE 0.3 MG/24H
1 PATCH, EXTENDED RELEASE TRANSDERMAL WEEKLY
Qty: 4 PATCH | Refills: 0 | Status: SHIPPED | OUTPATIENT
Start: 2021-12-24

## 2021-12-20 RX ORDER — CARVEDILOL 25 MG/1
25 TABLET ORAL 2 TIMES DAILY WITH MEALS
Qty: 60 TABLET | Refills: 0 | Status: SHIPPED | OUTPATIENT
Start: 2021-12-20

## 2021-12-20 RX ADMIN — TORSEMIDE 100 MG: 100 TABLET ORAL at 09:12

## 2021-12-20 RX ADMIN — FENTANYL CITRATE 25 MCG: 0.05 INJECTION, SOLUTION INTRAMUSCULAR; INTRAVENOUS at 12:07

## 2021-12-20 RX ADMIN — CARVEDILOL 25 MG: 25 TABLET, FILM COATED ORAL at 09:12

## 2021-12-20 RX ADMIN — AMLODIPINE BESYLATE 10 MG: 10 TABLET ORAL at 09:12

## 2021-12-20 RX ADMIN — GELATIN ABSORBABLE SPONGE 12-7 MM 1 EACH: 12-7 MISC at 12:11

## 2021-12-20 RX ADMIN — EMPAGLIFLOZIN 10 MG: 10 TABLET, FILM COATED ORAL at 09:12

## 2021-12-20 RX ADMIN — FERROUS SULFATE TAB 325 MG (65 MG ELEMENTAL FE) 325 MG: 325 (65 FE) TAB at 09:12

## 2021-12-20 RX ADMIN — MIDAZOLAM 1 MG: 1 INJECTION INTRAMUSCULAR; INTRAVENOUS at 11:59

## 2021-12-20 RX ADMIN — LIDOCAINE HYDROCHLORIDE 20 ML: 10 INJECTION, SOLUTION INFILTRATION; PERINEURAL at 12:00

## 2021-12-20 RX ADMIN — FENTANYL CITRATE 25 MCG: 0.05 INJECTION, SOLUTION INTRAMUSCULAR; INTRAVENOUS at 11:59

## 2021-12-20 NOTE — NURSING NOTE
S/w Dr. Landry. He wants the renal biopsy specimens sent to [facility].  Sarah in Radiology notified.

## 2021-12-20 NOTE — PROGRESS NOTES
NEPHROLOGY PROGRESS NOTE    PATIENT IDENTIFICATION:   Name:  Alexis Packer      MRN:  [id number]     46 y.o.  male             Reason for visit: CARROL    SUBJECTIVE:   Seen and examined.  No new issues overnight.  Denied any shortness of air or chest pain.  Feeling much better.  OBJECTIVE:  Vitals:    12/20/21 0710 12/20/21 1042 12/20/21 1144 12/20/21 1152   BP: 141/93 143/94 (!) 158/106 142/97   BP Location: Left arm Right arm     Patient Position: Lying Lying     Pulse: 78 73 76 73   Resp: 18 24 8 19   Temp: 97.7 °F (36.5 °C)      TempSrc: Oral      SpO2: 95% 97% 95% 95%   Weight:       Height:               Body mass index is 43.37 kg/m².    Intake/Output Summary (Last 24 hours) at 12/20/2021 1158  Last data filed at 12/20/2021 1112  Gross per 24 hour   Intake 120 ml   Output 5650 ml   Net -5530 ml         Exam:  GEN:  No distress, appears stated age  EYES:   Anicteric sclera  ENT:    External ears/nose normal, MM are moist  NECK:  No adenopathy, JVP none  LUNGS: Normal chest on inspection; not labored  CV:  Normal S1S2, without murmur  ABD:  Non-tender, non-distended, no hepatosplenomegaly, +BS  EXT:  No edema; no cyanosis; clubbing    Scheduled meds:  amLODIPine, 10 mg, Oral, Q24H  carvedilol, 25 mg, Oral, BID With Meals  cloNIDine, 1 patch, Transdermal, Weekly  empagliflozin, 10 mg, Oral, Daily  ferrous sulfate, 325 mg, Oral, BID With Meals  pravastatin, 20 mg, Oral, Nightly  torsemide, 100 mg, Oral, Daily  vitamin D, 50,000 Units, Oral, Q7 Days      IV meds:                           Data Review:    Results from last 7 days   Lab Units 12/20/21  0824 12/19/21  0707 12/18/21  0623 12/17/21  0830 12/17/21  0830 12/16/21  0652 12/16/21  0652 12/16/21  0651 12/16/21  0651   SODIUM mmol/L 141 138 140   < > 141  143   < > 140   < > 140   POTASSIUM mmol/L 3.7 3.3* 3.2*   < > 3.4*  3.4*   < > 3.6   < > 3.3*   CHLORIDE mmol/L 102 100 106   < > 107  110*   < > 108*   < > 107   CO2 mmol/L 34.0* 30.5* 29.0   < > 25.5   27.6   < > 26.0   < > 26.6   BUN mg/dL 15 12 11   < > 10  11   < > 12   < > 13   CREATININE mg/dL 1.60* 1.41* 1.37*   < > 1.51*  1.49*   < > 1.66*   < > 1.38*   CALCIUM mg/dL 8.0* 8.3* 8.0*   < > 7.8*  7.7*   < > 7.7*   < > 7.9*   BILIRUBIN mg/dL  --   --   --   --  <0.2  --  0.2  --  0.2   ALK PHOS U/L  --   --   --   --  71  --  80  --  74   ALT (SGPT) U/L  --   --   --   --  11  --  11  --  10   AST (SGOT) U/L  --   --   --   --  15  --  10  --  13   GLUCOSE mg/dL 91 86 94   < > 86  93   < > 94   < > 97    < > = values in this interval not displayed.       Estimated Creatinine Clearance: 91 mL/min (A) (by C-G formula based on SCr of 1.6 mg/dL (H)).    Results from last 7 days   Lab Units 12/17/21  0830 12/16/21  0651   MAGNESIUM mg/dL 1.9 1.9       Results from last 7 days   Lab Units 12/17/21  0830 12/16/21  0651 12/15/21  0534 12/14/21  1655   WBC 10*3/mm3 6.41 8.17 6.98 7.70   HEMOGLOBIN g/dL 11.3* 11.4* 10.8* 11.5*   PLATELETS 10*3/mm3 287 290 279 284       Results from last 7 days   Lab Units 12/20/21  1116   INR  1.1             ASSESSMENT:     Anasarca    Hypoalbuminemia    Elevated serum creatinine    Tobacco abuse    Class 3 severe obesity in adult ([facility])    Dyspnea    Hypertensive urgency    CARROL (acute kidney injury) ([facility])    Elevated brain natriuretic peptide (BNP) level    Proteinuria    Hematuria    Anemia    Elevated d-dimer    Vitamin D deficiency           - Proteinuria: Etiology is not very clear. He takes excessive NSAID;s. Will likely need a kidney biopsy. High D Dimer with proteinuria makes us worried about DVT/PE but CTA is negative.    - CARROL on CKD : Likely due to intravascular depletion and severe proteinuria. Will monitor kidney function closely  - High D Dimer: Had CTA  That was negative  - HTN:: Continue clonidine and Amlodipine and Coreg  - hypokalemia: Will check renin and aldosterone pending  - Hypocalcemia: Elevated PTH and low vitamin D  - Hyperlipidemia  -Secondary  hyperparathyroidism    PLAN:  Creatinine is mildly up at 1.6 mg/dL.  Patient becoming more euvolemic.  Going to change his torsemide to 50 mg p.o. daily.    GN work-up is unremarkable however we still waiting for RIMA 2R to rule out membranous nephropathy   Biopsy to be done today and tissue to be sent to [place]   mildly elevated kappa and lambda but ratio is normal.    Replete potassium as needed  Pressure is much better under control  Continue current blood pressure medications  Patient can be discharged home to follow-up as an outpatient however due to holiday I would rather patient to stay till we get the result of the kidney biopsy which going to be the day after tomorrow.  Will discuss with primary  Surveillance labs    Marylu Landry MD  12/20/2021    11:58 EST

## 2021-12-20 NOTE — PAYOR COMM NOTE
"Alexis Mejia (46 y.o. Male)     PLEASE SEE ATTACHED DC SUMMARY    REF#[id number]    THANK YOU    TARYN WALKER LPN CCP            Date of Birth Social Security Number Address Home Phone MRN    [id number]  [id number] [facility] [address] [phone] [id number]    Advent Marital Status             None Single       Admission Date Admission Type Admitting Provider Attending Provider Department, Room/Bed    12/14/21 Emergency   [address], S[address]/1    Discharge Date Discharge Disposition Discharge Destination          12/20/2021 Home or Self Care              Attending Provider: (none)   Allergies: No Known Allergies    Isolation: None   Infection: None   Code Status: CPR   Advance Care Planning Activity    Ht: 190.5 cm (75\")   Wt: 157 kg (347 lb)    Admission Cmt: None   Principal Problem: Anasarca [R60.1]                 Active Insurance as of 12/14/2021     Primary Coverage     Payor Plan Insurance Group Employer/Plan Group    [id number]     Payor Plan Address Payor Plan Phone Number Payor Plan Fax Number Effective Dates    [address] [address] [phone]  1/1/2020 - None Entered    [facility] [id number]       Subscriber Name Subscriber Birth Date Member ID       ALEXIS MEJIA [id number]                 Emergency Contacts      (Rel.) Home Phone Work Phone Mobile Phone    MELISSA MEJIA (Mother) [phone] -- --               Discharge Summary      Jac Neal MD at 12/20/21 1407                              [place] [facility]               [facility]    Date of Discharge:  12/20/2021    PCP: Provider, No Known    Discharge Diagnosis:   Active Hospital Problems    Diagnosis  POA   • **Anasarca [R60.1]  Yes   • Vitamin D deficiency [E55.9]  Yes   • Hypoalbuminemia [E88.09]  Yes   • Elevated serum creatinine [R79.89]  Yes   • Tobacco abuse [Z72.0]  Yes   • Class 3 severe obesity in adult (HCC) [E66.01]  Yes   • Dyspnea [R06.00]  Yes "   • Hypertensive urgency [I16.0]  Yes   • CARROL (acute kidney injury) (HCC) [N17.9]  Yes   • Elevated brain natriuretic peptide (BNP) level [R79.89]  Yes   • Proteinuria [R80.9]  Yes   • Hematuria [R31.9]  Yes   • Anemia [D64.9]  Yes   • Elevated d-dimer [R79.89]  Yes      Resolved Hospital Problems   No resolved problems to display.          Consults     Date and Time Order Name Status Description    12/17/2021  2:43 PM Inpatient Sleep Medicine Consult Completed     12/17/2021 10:27 AM Cardiac Electrophysiologist Inpatient Consult Completed     12/16/2021  4:12 PM Inpatient Cardiology Consult Completed     12/15/2021  9:41 AM Inpatient Nephrology Consult Completed     12/14/2021 11:51 PM [facility] (on-call MD unless specified) Details          Hospital Course  46 y.o. male admitted with anasarca proteinuria hematuria hypoalbuminemia hypertensive urgency. He was diuresed nearly 80 pounds over about 5 days. Etiology of proteinuria not clear and nephrology recommended a kidney biopsy that was performed today 12/20/2021. He was started on multiple medications treating hypertension and heart failure with preserved ejection fraction. He was also felt to undertreated obstructive sleep apnea and patient will need an outpatient sleep study. Patient would very much like to go home though nephrology preferred he stay in the hospital until biopsy result is back they felt he could be discharged home with close outpatient follow-up.    Results for orders placed during the hospital encounter of 12/14/21    Adult Transthoracic Echo Complete W/ Cont if Necessary Per Protocol    Interpretation Summary  · Estimated left ventricular EF = 56% Estimated left ventricular EF was in agreement with the calculated left ventricular EF. Left ventricular systolic function is normal.  · Left ventricular wall thickness is consistent with moderate to severe concentric hypertrophy.  · Mild lateral wall hypokinesis  · Left ventricular diastolic function is  consistent with (grade I) impaired relaxation.  · The right ventricular cavity is mildly dilated.  · Estimated right ventricular systolic pressure from tricuspid regurgitation is normal (<35 mmHg).  · No aortic valve regurgitation or stenosis is present.  · Mild mitral valve regurgitation is present.  · Mild tricuspid valve regurgitation is present.     I discussed the patient's findings and my recommendations with patient and nursing staff.    Condition on Discharge: Improved.     Temp:  [97.7 °F (36.5 °C)-98.2 °F (36.8 °C)] 97.7 °F (36.5 °C)  Heart Rate:  [72-79] 79  Resp:  [8-24] 18  BP: (134-158)/() 135/87  Body mass index is 43.37 kg/m².    Physical Exam  Constitutional:       General: He is not in acute distress.     Appearance: Normal appearance. He is not toxic-appearing.   HENT:      Head: Normocephalic and atraumatic.      Right Ear: External ear normal.      Left Ear: External ear normal.      Nose: Nose normal.   Eyes:      Conjunctiva/sclera: Conjunctivae normal.   Cardiovascular:      Rate and Rhythm: Normal rate and regular rhythm.   Pulmonary:      Effort: Pulmonary effort is normal. No respiratory distress.      Breath sounds: Normal breath sounds. No wheezing or rhonchi.   Abdominal:      General: Bowel sounds are normal. There is no distension.      Palpations: Abdomen is soft.      Tenderness: There is no abdominal tenderness. There is no guarding or rebound.   Musculoskeletal:         General: No swelling.      Right lower le+ Edema present.      Left lower le+ Edema present.   Skin:     General: Skin is warm and dry.   Neurological:      General: No focal deficit present.      Mental Status: He is alert and oriented to person, place, and time.   Psychiatric:         Mood and Affect: Mood normal.         Behavior: Behavior normal.         Thought Content: Thought content normal.     While in the room and during my examination of the patient I wore gloves, mask, eye protection.  I  washed my hands before and after this patient encounter.     Disposition: Home or Self Care       Discharge Medications      New Medications      Instructions Start Date   amLODIPine 10 MG tablet  Commonly known as: NORVASC   10 mg, Oral, Every 24 Hours Scheduled   Start Date: December 21, 2021     carvedilol 25 MG tablet  Commonly known as: COREG   25 mg, Oral, 2 Times Daily With Meals      cloNIDine 0.3 MG/24HR patch  Commonly known as: CATAPRES-TTS   1 patch, Transdermal, Weekly   Start Date: December 24, 2021     empagliflozin 10 MG tablet tablet  Commonly known as: JARDIANCE   10 mg, Oral, Daily   Start Date: December 21, 2021     ferrous sulfate 325 (65 FE) MG tablet   325 mg, Oral, 2 Times Daily With Meals      pravastatin 20 MG tablet  Commonly known as: PRAVACHOL   20 mg, Oral, Nightly      torsemide 10 MG tablet  Commonly known as: DEMADEX   50 mg, Oral, Daily   Start Date: December 21, 2021     vitamin D 1.25 MG (98669 UT) capsule capsule  Commonly known as: ERGOCALCIFEROL   50,000 Units, Oral, Every 7 Days   Start Date: December 24, 2021           Diet Instructions     Diet: Regular, Cardiac      Discharge Diet:  Regular  Cardiac            Activity Instructions     Activity as Tolerated           Additional Instructions for the Follow-ups that You Need to Schedule     Ambulatory Referral to Sleep Medicine   As directed      Call MD for problems / concerns.   As directed         Follow-up Information     Dmitry Kent III, MD Follow up.    Specialty: Cardiology  Contact information:  [address]  [address]  [facility] [address]  [phone]             Marylu Landry MD Follow up.    Specialty: Nephrology  Contact information:  [address]  [facility] [address]  [facility] [address]  [phone]             [facility] Follow up.    Why: sleep study  Contact information:  [address], [facility] [address]  [facility] [address]  [phone]                      Pending Labs     Order Current  Status    Aldosterone / Renin Ratio In process    Anti-PLA2R In process    Immunofixation, Urine - In process    Metanephrines, Frac. Free, Plasma In process    Protein Electrophoresis, 24 Hr Urine - In process    Tissue Pathology Exam In process         Jac Neal MD  12/20/21  14:17 EST    Discharge time spent greater than 30 minutes.      Electronically signed by Jac Neal MD at 12/20/21 9016

## 2021-12-20 NOTE — NURSING NOTE
This RN missed the order and discharged patient without Zio patch. This RN notified [facility], s/w Pattie GARCIA. She will contact patient and have him follow up in the office for holter monitor.

## 2021-12-20 NOTE — PLAN OF CARE
Goal Outcome Evaluation:  Plan of Care Reviewed With: patient        Progress: improving  Pt was alert & oriented x4 with stable vital signs, medicated as ordered & tolerated well  Pt has been npo past mn for Renal CT needle biopsy today. No distress noticed or reported. I will continue to monitor.

## 2021-12-20 NOTE — DISCHARGE SUMMARY
[facility]    Date of Discharge:  12/20/2021    PCP: Provider, No Known    Discharge Diagnosis:   Active Hospital Problems    Diagnosis  POA   • **Anasarca [R60.1]  Yes   • Vitamin D deficiency [E55.9]  Yes   • Hypoalbuminemia [E88.09]  Yes   • Elevated serum creatinine [R79.89]  Yes   • Tobacco abuse [Z72.0]  Yes   • Class 3 severe obesity in adult (HCC) [E66.01]  Yes   • Dyspnea [R06.00]  Yes   • Hypertensive urgency [I16.0]  Yes   • CARROL (acute kidney injury) (HCC) [N17.9]  Yes   • Elevated brain natriuretic peptide (BNP) level [R79.89]  Yes   • Proteinuria [R80.9]  Yes   • Hematuria [R31.9]  Yes   • Anemia [D64.9]  Yes   • Elevated d-dimer [R79.89]  Yes      Resolved Hospital Problems   No resolved problems to display.          Consults     Date and Time Order Name Status Description    12/17/2021  2:43 PM Inpatient Sleep Medicine Consult Completed     12/17/2021 10:27 AM Cardiac Electrophysiologist Inpatient Consult Completed     12/16/2021  4:12 PM Inpatient Cardiology Consult Completed     12/15/2021  9:41 AM Inpatient Nephrology Consult Completed     12/14/2021 11:51 PM [facility] (on-call MD unless specified) Details          Hospital Course  46 y.o. male admitted with anasarca proteinuria hematuria hypoalbuminemia hypertensive urgency. He was diuresed nearly 80 pounds over about 5 days. Etiology of proteinuria not clear and nephrology recommended a kidney biopsy that was performed today 12/20/2021. He was started on multiple medications treating hypertension and heart failure with preserved ejection fraction. He was also felt to undertreated obstructive sleep apnea and patient will need an outpatient sleep study. Patient would very much like to go home though nephrology preferred he stay in the hospital until biopsy result is back they felt he could be discharged home with close outpatient follow-up.    Results for orders placed during the hospital encounter  of 21    Adult Transthoracic Echo Complete W/ Cont if Necessary Per Protocol    Interpretation Summary  · Estimated left ventricular EF = 56% Estimated left ventricular EF was in agreement with the calculated left ventricular EF. Left ventricular systolic function is normal.  · Left ventricular wall thickness is consistent with moderate to severe concentric hypertrophy.  · Mild lateral wall hypokinesis  · Left ventricular diastolic function is consistent with (grade I) impaired relaxation.  · The right ventricular cavity is mildly dilated.  · Estimated right ventricular systolic pressure from tricuspid regurgitation is normal (<35 mmHg).  · No aortic valve regurgitation or stenosis is present.  · Mild mitral valve regurgitation is present.  · Mild tricuspid valve regurgitation is present.     I discussed the patient's findings and my recommendations with patient and nursing staff.    Condition on Discharge: Improved.     Temp:  [97.7 °F (36.5 °C)-98.2 °F (36.8 °C)] 97.7 °F (36.5 °C)  Heart Rate:  [72-79] 79  Resp:  [8-24] 18  BP: (134-158)/() 135/87  Body mass index is 43.37 kg/m².    Physical Exam  Constitutional:       General: He is not in acute distress.     Appearance: Normal appearance. He is not toxic-appearing.   HENT:      Head: Normocephalic and atraumatic.      Right Ear: External ear normal.      Left Ear: External ear normal.      Nose: Nose normal.   Eyes:      Conjunctiva/sclera: Conjunctivae normal.   Cardiovascular:      Rate and Rhythm: Normal rate and regular rhythm.   Pulmonary:      Effort: Pulmonary effort is normal. No respiratory distress.      Breath sounds: Normal breath sounds. No wheezing or rhonchi.   Abdominal:      General: Bowel sounds are normal. There is no distension.      Palpations: Abdomen is soft.      Tenderness: There is no abdominal tenderness. There is no guarding or rebound.   Musculoskeletal:         General: No swelling.      Right lower le+ Edema  present.      Left lower le+ Edema present.   Skin:     General: Skin is warm and dry.   Neurological:      General: No focal deficit present.      Mental Status: He is alert and oriented to person, place, and time.   Psychiatric:         Mood and Affect: Mood normal.         Behavior: Behavior normal.         Thought Content: Thought content normal.     While in the room and during my examination of the patient I wore gloves, mask, eye protection.  I washed my hands before and after this patient encounter.     Disposition: Home or Self Care       Discharge Medications      New Medications      Instructions Start Date   amLODIPine 10 MG tablet  Commonly known as: NORVASC   10 mg, Oral, Every 24 Hours Scheduled   Start Date: 2021     carvedilol 25 MG tablet  Commonly known as: COREG   25 mg, Oral, 2 Times Daily With Meals      cloNIDine 0.3 MG/24HR patch  Commonly known as: CATAPRES-TTS   1 patch, Transdermal, Weekly   Start Date: 2021     empagliflozin 10 MG tablet tablet  Commonly known as: JARDIANCE   10 mg, Oral, Daily   Start Date: 2021     ferrous sulfate 325 (65 FE) MG tablet   325 mg, Oral, 2 Times Daily With Meals      pravastatin 20 MG tablet  Commonly known as: PRAVACHOL   20 mg, Oral, Nightly      torsemide 10 MG tablet  Commonly known as: DEMADEX   50 mg, Oral, Daily   Start Date: 2021     vitamin D 1.25 MG (61984 UT) capsule capsule  Commonly known as: ERGOCALCIFEROL   50,000 Units, Oral, Every 7 Days   Start Date: 2021           Diet Instructions     Diet: Regular, Cardiac      Discharge Diet:  Regular  Cardiac            Activity Instructions     Activity as Tolerated           Additional Instructions for the Follow-ups that You Need to Schedule     Ambulatory Referral to Sleep Medicine   As directed      Call MD for problems / concerns.   As directed         Follow-up Information     Dmitry Kent III, MD Follow up.    Specialty:  Cardiology  Contact information:  [address]  [address]  [facility] [address]  [phone]             Marylu Landry MD Follow up.    Specialty: Nephrology  Contact information:  [address]  [address]  [facility] [address]  [phone]             [facility] Follow up.    Why: sleep study  Contact information:  [address], [facility] [address]  [facility] [address]  [phone]                      Pending Labs     Order Current Status    Aldosterone / Renin Ratio In process    Anti-PLA2R In process    Immunofixation, Urine - In process    Metanephrines, Frac. Free, Plasma In process    Protein Electrophoresis, 24 Hr Urine - In process    Tissue Pathology Exam In process         Jac Neal MD  12/20/21  14:17 EST    Discharge time spent greater than 30 minutes.

## 2021-12-21 LAB
ALBUMIN 24H MFR UR ELPH: 57.6 %
ALPHA1 GLOB 24H MFR UR ELPH: 9.7 %
ALPHA2 GLOB 24H MFR UR ELPH: 11.2 %
B-GLOBULIN 24H MFR UR ELPH: 13 %
GAMMA GLOB 24H MFR UR ELPH: 8.5 %
INTERPRETATION UR IFE-IMP: NORMAL
LABORATORY COMMENT REPORT: ABNORMAL
M PROTEIN 24H MFR UR ELPH: ABNORMAL %
PLA2R IGG SER IA-ACNC: 1128.3 RU/ML (ref 0–19.9)
PROT 24H UR-MRATE: ABNORMAL MG/24 HR (ref 30–150)
PROT UR-MCNC: 259.6 MG/DL

## 2021-12-21 NOTE — POST-PROCEDURE NOTE
POST PROCEDURE NOTE    Procedure: kidney bx    Pre-Procedure Diagnosis: paola    Post-procedure Diagnosis: same    Findings: successf.random right kidney bx, gelfoam used    Complications: none    Blood loss: min    Specimen Removed: 5x18G passes    Disposition:   Transfer back to inpatient room

## 2021-12-22 NOTE — CASE MANAGEMENT/SOCIAL WORK
Case Management Discharge Note      Final Note: Pt was dc'd home    Provided Post Acute Provider List?: N/A  Provided Post Acute Provider Quality & Resource List?: N/A    Selected Continued Care - Discharged on 12/20/2021 Admission date: 12/14/2021 - Discharge disposition: Home or Self Care    Destination    No services have been selected for the patient.              Durable Medical Equipment    No services have been selected for the patient.              Dialysis/Infusion    No services have been selected for the patient.              Home Medical Care    No services have been selected for the patient.              Therapy    No services have been selected for the patient.              Community Resources    No services have been selected for the patient.              Community & DME    No services have been selected for the patient.                  Transportation Services  Private: Car    Final Discharge Disposition Code: 01 - home or self-care

## 2021-12-26 LAB
ALDOST SERPL-MCNC: 1.3 NG/DL (ref 0–30)
ALDOST/RENIN PLAS-RTO: >7.8 {RATIO} (ref 0–30)
RENIN PLAS-CCNC: <0.167 NG/ML/HR (ref 0.17–5.38)

## 2021-12-27 LAB
METANEPH FREE SERPL-MCNC: 30.2 PG/ML (ref 0–88)
NORMETANEPHRINE SERPL-MCNC: 71 PG/ML (ref 0–218.9)

## 2021-12-29 LAB
LAB AP CASE REPORT: NORMAL
LAB AP DIAGNOSIS COMMENT: NORMAL
PATH REPORT.ADDENDUM SPEC: NORMAL
PATH REPORT.FINAL DX SPEC: NORMAL
PATH REPORT.GROSS SPEC: NORMAL